# Patient Record
Sex: FEMALE | Race: WHITE | NOT HISPANIC OR LATINO | URBAN - METROPOLITAN AREA
[De-identification: names, ages, dates, MRNs, and addresses within clinical notes are randomized per-mention and may not be internally consistent; named-entity substitution may affect disease eponyms.]

---

## 2022-06-15 ENCOUNTER — OFFICE VISIT (OUTPATIENT)
Dept: NEUROLOGY | Facility: CLINIC | Age: 47
End: 2022-06-15
Payer: COMMERCIAL

## 2022-06-15 VITALS — OXYGEN SATURATION: 99 % | TEMPERATURE: 97.7 F | HEART RATE: 82 BPM | WEIGHT: 254.4 LBS | RESPIRATION RATE: 14 BRPM

## 2022-06-15 DIAGNOSIS — E55.9 VITAMIN D DEFICIENCY: ICD-10-CM

## 2022-06-15 DIAGNOSIS — G44.40 MEDICATION OVERUSE HEADACHE: ICD-10-CM

## 2022-06-15 DIAGNOSIS — E61.1 IRON DEFICIENCY: Primary | ICD-10-CM

## 2022-06-15 DIAGNOSIS — G43.711 INTRACTABLE CHRONIC MIGRAINE WITHOUT AURA AND WITH STATUS MIGRAINOSUS: ICD-10-CM

## 2022-06-15 DIAGNOSIS — M54.2 CERVICALGIA: ICD-10-CM

## 2022-06-15 DIAGNOSIS — E53.8 VITAMIN B12 DEFICIENCY: ICD-10-CM

## 2022-06-15 PROBLEM — E66.01 CLASS 3 SEVERE OBESITY DUE TO EXCESS CALORIES WITH BODY MASS INDEX (BMI) OF 40.0 TO 44.9 IN ADULT (CMS/HCC): Status: RESOLVED | Noted: 2019-07-17 | Resolved: 2022-06-15

## 2022-06-15 PROBLEM — N83.201 CYST OF RIGHT OVARY: Status: ACTIVE | Noted: 2018-02-19

## 2022-06-15 PROBLEM — E66.813 CLASS 3 SEVERE OBESITY DUE TO EXCESS CALORIES WITH BODY MASS INDEX (BMI) OF 40.0 TO 44.9 IN ADULT (CMS/HCC): Status: RESOLVED | Noted: 2019-07-17 | Resolved: 2022-06-15

## 2022-06-15 PROBLEM — L65.9 ALOPECIA: Status: ACTIVE | Noted: 2017-06-20

## 2022-06-15 PROBLEM — R93.89 THICKENED ENDOMETRIUM: Status: ACTIVE | Noted: 2019-01-04

## 2022-06-15 PROBLEM — E07.9 DISORDER OF THYROID GLAND: Status: ACTIVE | Noted: 2019-10-02

## 2022-06-15 PROBLEM — L90.0 LICHEN SCLEROSUS ET ATROPHICUS: Status: ACTIVE | Noted: 2017-12-06

## 2022-06-15 PROBLEM — R10.2 PAIN IN PELVIS: Status: ACTIVE | Noted: 2018-02-02

## 2022-06-15 PROBLEM — R10.2 PAIN IN PELVIS: Status: RESOLVED | Noted: 2018-02-02 | Resolved: 2022-06-15

## 2022-06-15 PROBLEM — E07.9 DISORDER OF THYROID GLAND: Status: RESOLVED | Noted: 2019-10-02 | Resolved: 2022-06-15

## 2022-06-15 PROBLEM — L65.9 ALOPECIA: Status: RESOLVED | Noted: 2017-06-20 | Resolved: 2022-06-15

## 2022-06-15 PROBLEM — E66.01 CLASS 3 SEVERE OBESITY DUE TO EXCESS CALORIES WITH BODY MASS INDEX (BMI) OF 40.0 TO 44.9 IN ADULT (CMS/HCC): Status: ACTIVE | Noted: 2019-07-17

## 2022-06-15 PROBLEM — E66.813 CLASS 3 SEVERE OBESITY DUE TO EXCESS CALORIES WITH BODY MASS INDEX (BMI) OF 40.0 TO 44.9 IN ADULT (CMS/HCC): Status: ACTIVE | Noted: 2019-07-17

## 2022-06-15 PROBLEM — M54.50 LOW BACK PAIN: Status: ACTIVE | Noted: 2019-02-13

## 2022-06-15 PROBLEM — R93.89 THICKENED ENDOMETRIUM: Status: RESOLVED | Noted: 2019-01-04 | Resolved: 2022-06-15

## 2022-06-15 PROBLEM — M54.50 LOW BACK PAIN: Status: RESOLVED | Noted: 2019-02-13 | Resolved: 2022-06-15

## 2022-06-15 PROBLEM — F41.1 GENERALIZED ANXIETY DISORDER: Status: ACTIVE | Noted: 2022-06-15

## 2022-06-15 PROCEDURE — 99205 OFFICE O/P NEW HI 60 MIN: CPT | Performed by: PSYCHIATRY & NEUROLOGY

## 2022-06-15 RX ORDER — VENLAFAXINE HYDROCHLORIDE 37.5 MG/1
CAPSULE, EXTENDED RELEASE ORAL
Qty: 60 CAPSULE | Refills: 1 | Status: SHIPPED | OUTPATIENT
Start: 2022-06-15 | End: 2022-07-21

## 2022-06-15 RX ORDER — IRBESARTAN 75 MG/1
75 TABLET ORAL NIGHTLY
COMMUNITY
End: 2022-08-26

## 2022-06-15 RX ORDER — AMITRIPTYLINE HYDROCHLORIDE 50 MG/1
TABLET, FILM COATED ORAL
COMMUNITY
End: 2022-07-21

## 2022-06-15 RX ORDER — SUMATRIPTAN SUCCINATE 50 MG/1
TABLET ORAL
COMMUNITY
End: 2022-08-26 | Stop reason: SDUPTHER

## 2022-06-15 RX ORDER — ALPRAZOLAM 0.25 MG/1
TABLET ORAL
COMMUNITY

## 2022-06-15 RX ORDER — RIMEGEPANT SULFATE 75 MG/75MG
75 TABLET, ORALLY DISINTEGRATING ORAL ONCE
Qty: 8 TABLET | Refills: 3 | Status: SHIPPED | OUTPATIENT
Start: 2022-06-15 | End: 2022-06-15

## 2022-06-15 NOTE — PATIENT INSTRUCTIONS
Please message us via My Chart with any questions or concerns    Of note: Patient lives 2 hours from .    Neck pain:  Basic neck exercises for daily use:    - Neck pathology and poor posture, with straightening of the normal cervical lordosis, can cause headaches.  Tightening of the neck muscles can irritate the nerves in the occipital region of the head and cause or worsen head pain. Thus neck strengthening and relaxation exercises, can help improve this particular pain. It is importance to have good posture for improving shoulder, neck, and head pain.    - Here are some exercises which should take 5 minutes:     1. Standing, drop your head to one side while continuing to look ahead. Hold for 10 seconds then swap sides. Repeat twice more each side. To increase the stretch, drop the opposite shoulder.    2. Standing again, lower your chin to your chest, hold for 10 and then look up to the ceiling and hold for 10. Repeat twice more.     3. Next, standing straight again, look over your right shoulder and hold firm for 10 seconds, then over your left shoulder for 10. Repeat this 3 times.     4. Finally, while sitting upright, bring your head forward and hold for 10, then all the way back and hold for 10.    If this simple exercise does not help improve the posture, we will consider formal physical therapy in the future.     Importance of Healthy Sleep:  Behavioral sleep changes can promote restful, regular sleep and reduce headache. Simple changes like establishing consistent sleep and wake-up times, as well as getting between 7 and 8 hours of sleep a day, can make a world of difference. Experts also recommend avoiding substances that impair sleep, like caffeine, nicotine and alcohol, and also suggest winding down before bed to prevent sleep problems. To read more go to [email or web address].org/resource-library/sleep/    Medication overuse headaches:   - Medication overuse headache  "(MOH) and analgesic overuse can negate the effectiveness of headache preventive measures.  Avoid medications with narcotics, barbiturates, or caffeine in them as these can cause rebound headaches after very few doses and can interfere with other headache medicine efficacy. Taking  any acute/abortive over the counter medication or prescription drugs for more than 2-3 days a week can cause medication overuse headache.   Chronic migraine headaches with and without aura  Preventive therapy for headaches:   - amitriptyline 50 mg one in pm daily and goal is to wean off of this slowly.   - will start her on venlafaxine 37.5 mg one in am daily for 6 weeks then call [facility] with update and if no side effects plan to go down on amitriptyline to 40 mg in pm. Then continue to increase venlafaxine and slowly wean off of this.   - Emgality 120 mg two injection as loading dose and then one injection once a month after that.   - consider infusion  - Nurtec 75 mg one in am every other day for 7 days  Abortive therapy for headaches:   - At the onset of headache: sumatriptan 50 mg or 100mg - goal to go down to 2-3 a week or less    Goal is to reduce Excedrin Migraine that she is taking twice a day along with Advil at bedtime.      Headache management instructions  - When patient has a moderate to severe headache, they should seek rest, initiate relaxation and apply cold compresses to the head.   - Maintain regular sleep schedule. Adults need at least 7-8 hours of uninterrupted sleep, per night.   - Limit over the counter medications such as Tylenol, Ibuprofen, Aleve, Excedrin. (No more than 2- 3 times a week or max 10 a month).  - Maintain headache diary.  Free ANTWON for a smart phone, which can be used is \"Migraine ammon\"  - Limit caffeine to 1-2 cups, 8 to 16 oz a day or less.  - Avoid dietary trigger. (aged cheese, peanuts, MSG, aspartame and nitrates).  - Patient is to have regular frequent meals to prevent headache onset.    - Please " drink at least 64 ounces of water a day, to help remain hydrated.              Headache Infusion Arrival Time/Info    ** Please be sure to bring lunch, snacks and drinks      Monday 8:00 am - 4:00 pm     Tuesday 8:00 am - 4:00 pm     Wednesday 8:00 am - 4:00 pm     Thursday 8:00 am - 4:00 pm     Friday  8:00 am - 3:30 pm     No 3- or 6-hour infusions after 1:00.  All infusions will be completed 30 minutes before closing.        Please bring Insurance Card and Photo ID  You will be rescheduled if you arrive after 11 am  To cancel appointment call center [phone]  Wear comfortable clothing and dress in layers with easy access to your arm  Must have a ride home.      INFUSION CENTER  What is the Infusion Center?  The infusion Center is where an individual may come to receive outpatient intravenous medication for the purpose of aborting his or her headache and//or migraine symptoms.  You will need to check with your physician or their nurse about what medicines to continue or stop during treatment.  The address of the infusion center is:  [address] [place] [address], [place] [address]    Who is eligible for treatment?  Individuals with frequent chronic daily headaches or migraine symptoms are eligible.  Patients - all of whom have been evaluated in the Headache Center - may have a pre-scheduled appointment.    How long will I be there?  Plan on staying about 2-3 hours, but the length of treatment may vary depending on the medications you will receive.  Your nurse will be able to give you an approximate time after she receives the orders from your physician.  Some individuals may be scheduled for 3 to 5 consecutive days, and some individuals will be receiving a one-day treatment only.      Does someone need to stay with me?  You will need to have transportation to and from the Infusion Center, as some medications we use may cause drowsiness.  You can have someone either drop you off in the morning, and then we  can call for your ride about 1 hour before you finish.  If your /family member will be staying, only one person is permitted to sit with you in the infusion area.  Because some of the medications can make you drowsy, you must have someone drive you, you can NOT use public transportation.    What will I be doing?  Most individuals prefer to sleep during the infusion.  There is a TV and DVD player in each infusion bay.  There are only limited DVD's so feel free to bring your own.  Also, if you wish, you may bring a CD or MP3 player as long as you have headphones.  All chairs in the treatment area recline for comfort.    What should I bring?  You may bring a light lunch, snack or beverage.  The infusion center does have a limited choice of some drinks and snacks.  The nurses will need access to your arm for placement of an IV so please dress appropriately.   Please refrain from wearing perfumes, colognes and heavy scented lotions.    What medications will I be receiving?  There are many medications that can be used for your infusion therapy, and your provider will decide which ones and how much you will receive.  The medicines are chosen based on your type of headache, other medical conditions, allergies, and previous response to certain medicines.  Below are the most common classifications of medications we use.  Within each classification, there are several different medicines.    ANTI-HISTAMINES:  Anti-Histamines are primarily used to treat allergies, and they are also helpful in promoting relaxation during your treatment and, therefore, may cause some drowsiness.  Some are known to abort headaches.    ANTI-EMETICS:  Anti-Emetics are used to prevent and/or treat nausea and vomiting, and some are known to abort headaches.  You may not have nausea upon arrival for your treatment, but some medicines have the potential to cause nausea.  A few of these medicines may cause drowsiness.    ANTI-SEIZURES:  Anti-seizure  medicines have shown to be effective in the prevention as well as the treatment of headaches.  It does not mean you have a seizure disorder.  These medicines help to desensitize painful, irritated nerve endings that can become inflamed during headache episodes.    SMOOTH MUSCLE RELAXER:  This medicine group helps to relax your muscles, especially in the neck and shoulder area.  Tightening of the muscles may contribute to your headache pain.  The most common one used for headache treatment is magnesium sulfate.  It is normal to feel flushing or warmth of your skin while infusing magnesium.    STEROIDS:  Steroids are used for their strong anti-inflammatory capability.  They help to decrease the swollen blood vessels, which will help to relieve headache pain.    You could experience some insomnia the night after receiving this medicine.  They are not the types of steroids abused by some athletes.    NSAIDS:  Non-Steroidal anti-inflammatory drugs also help to decrease swollen blood vessels that may cause increased headache pain.  This class of medicine is milder than steroids and has fewer side effects.    DHE-45 (DIHYDROERGOTAMINE):  DHE works as a vasoconstrictor, reducing the swollen vessels, thus relieving pain.  When receiving DHE for the first time, the dose may be divided into smaller amounts to increase the tolerance and decrease the chance of nausea.  After receiving DHE, your blood pressure will be monitored for signs of elevation.  As with any vasoconstricting medication, you may experience slight chest pressure.    What happens on discharge?  You will be given final instructions and review the medications ordered by your physician.         Cognitive behavioral therapy (CBT):  - Is a common type of talk therapy (psychotherapy) were you work with a psychotherapist or therapist . CBT helps you become aware of inaccurate or negative thinking so you can view challenging situations more clearly and respond to them  in a more effective way. CBT can be a very helpful tool ? either alone or in combination with other therapies ? in treating mental health disorders and chronic pain. CBT can be an effective tool to help anyone learn how to better manage stressful life situations and pain. In some cases, CBT is most effective when it's combined with other treatments, such as antidepressants or other medications.  You can start yourself by down loading the yamini: [employer]      Mindfulness/Meditation:  -Many people believe that stress is a major trigger for their pain. This is where mindfulness and meditation can come into play, as they have been known to help reduce migraine severity, duration and acute pain medication use. It may also help to relieve stress and anxiety while improving feelings of well being.    Biofeedback:   - Involves becoming more aware of the changes that occur in the body and learning how to exert control over generally involuntary functions. Biofeedback allows you to see your vitals in real-time and learn how to stabilize them on your own. There is great evidence that biofeedback can reduce the frequency, intensity, and duration of pain.   When you're stressed, you may notice elevated heart rates, tightened muscles, and sweating.  During biofeedback, you can see these changes on a monitor, then a therapist teaches you exercises to help manage these changes.    Yoga/Chris Chi:  - The kind of mind/body therapy that yoga can provide may help create relief from pain. Keeping up with yoga consistently can reduce headache frequency, intensity and duration, so it's important to practice regularly if you plan to use it as a complementary migraine treatment. However, certain types of yoga such as “ hot yoga”   may be uncomfortable for people with migraine. Others, such as “ restorative yoga,” may be tolerable even for a patient with chronic migraine.  Chris Chi can also have a similar benefit for patients with migraine.  Specifically, it can help improve balance, which can be very useful for those with vestibular symptoms or vestibular migraine.    Acupuncture:  - A traditional Chinese medicine, acupuncture is reported to increase the release of serotonin, dopamine and other chemicals that may help to treat chronic pain, and can be helpful in preventing episodic migraine. There are, however, conflicting results on studies in acupuncture as a treatment for migraine.    Exercising:    - Regular exercise can reduce the frequency and intensity pain. When one exercises, the body releases endorphins, which are the body's natural painkillers. Exercise reduces stress and helps individuals to sleep at night. Exercising at least 30 to 40 minutes 3 times a week is sufficient for most patients.   When exercising, follow this plan:  - First, stay hydrated before, during, and after exercise.    - Second part of the exercise plan is to eat sufficient food about an hour and a half before you exercise. Exercise causes one's blood sugar level to decrease, and it is important to have a source of energy.   - Final part of the exercise plan is to warm-up. Do not jump into sudden, vigorous exercise if that triggers a headache or migraine.       To read more go to [email or web address]-library/effects-of-exercise-on-headaches-and-migraines

## 2022-06-15 NOTE — PROGRESS NOTES
[facility]   Headache Center  Nupur Gandhi MD  [address] [facility] ([address])  [place] [address]       Patient ID: Smitha Cardozo    : 1975  MRN: [id number]                                            Visit Date: 6/15/2022  Encounter Provider: Nupur Gandhi  Referring Provider: No ref. provider found           Assessment/Plan   Problem List Items Addressed This Visit        Nervous    Cervicalgia    Intractable chronic migraine without aura and with status migrainosus    Relevant Medications    amitriptyline (ELAVIL) 50 mg tablet    SUMAtriptan (IMITREX) 50 mg tablet    ubrogepant (UBRELVY) 100 mg tablet tablet    venlafaxine XR (EFFEXOR XR) 37.5 mg 24 hr capsule    galcanezumab-gnlm (EMGALITY) 120 mg/mL pen injector subcutaneous pen    rimegepant (NURTEC ODT) 75 mg tablet,disintegrating    Medication overuse headache    Relevant Medications    venlafaxine XR (EFFEXOR XR) 37.5 mg 24 hr capsule    galcanezumab-gnlm (EMGALITY) 120 mg/mL pen injector subcutaneous pen    rimegepant (NURTEC ODT) 75 mg tablet,disintegrating       Digestive    Iron deficiency - Primary    Relevant Orders    Iron and TIBC    Ferritin    Vitamin D deficiency    Relevant Orders    Vitamin D 25 hydroxy      Other Visit Diagnoses     Vitamin B12 deficiency        Relevant Orders    Vitamin B12          Please message us via My Chart with any questions or concerns    Of note: Patient lives 2 hours from [place].    Neck pain:  Basic neck exercises for daily use:    - Neck pathology and poor posture, with straightening of the normal cervical lordosis, can cause headaches.  Tightening of the neck muscles can irritate the nerves in the occipital region of the head and cause or worsen head pain. Thus neck strengthening and relaxation exercises, can help improve this particular pain. It is importance to have good posture for improving shoulder, neck, and head pain.    - Here are some exercises which  should take 5 minutes:     1. Standing, drop your head to one side while continuing to look ahead. Hold for 10 seconds then swap sides. Repeat twice more each side. To increase the stretch, drop the opposite shoulder.    2. Standing again, lower your chin to your chest, hold for 10 and then look up to the ceiling and hold for 10. Repeat twice more.     3. Next, standing straight again, look over your right shoulder and hold firm for 10 seconds, then over your left shoulder for 10. Repeat this 3 times.     4. Finally, while sitting upright, bring your head forward and hold for 10, then all the way back and hold for 10.    If this simple exercise does not help improve the posture, we will consider formal physical therapy in the future.     Importance of Healthy Sleep:  Behavioral sleep changes can promote restful, regular sleep and reduce headache. Simple changes like establishing consistent sleep and wake-up times, as well as getting between 7 and 8 hours of sleep a day, can make a world of difference. Experts also recommend avoiding substances that impair sleep, like caffeine, nicotine and alcohol, and also suggest winding down before bed to prevent sleep problems. To read more go to https://americanmigrainefoundation.org/resource-library/sleep/    Medication overuse headaches:   - Medication overuse headache (MOH) and analgesic overuse can negate the effectiveness of headache preventive measures.  Avoid medications with narcotics, barbiturates, or caffeine in them as these can cause rebound headaches after very few doses and can interfere with other headache medicine efficacy. Taking  any acute/abortive over the counter medication or prescription drugs for more than 2-3 days a week can cause medication overuse headache.   Chronic migraine headaches with and without aura  Preventive therapy for headaches:   - amitriptyline 50 mg one in pm daily and goal is to wean off of this slowly.   - will start her on  "venlafaxine 37.5 mg one in am daily for 6 weeks then call Hiram with update and if no side effects plan to go down on amitriptyline to 40 mg in pm. Then continue to increase venlafaxine and slowly wean off of this.   - Emgality 120 mg two injection as loading dose and then one injection once a month after that.   - consider infusion  - Nurtec 75 mg one in am every other day for 7 days-Nurtec sample 2 boxes given to patient today  Abortive therapy for headaches:   - At the onset of headache: sumatriptan 50 mg or 100mg - goal to go down to 2-3 a week or less    Goal is to reduce Excedrin Migraine that she is taking twice a day along with Advil at bedtime.      Headache management instructions  - When patient has a moderate to severe headache, they should seek rest, initiate relaxation and apply cold compresses to the head.   - Maintain regular sleep schedule. Adults need at least 7-8 hours of uninterrupted sleep, per night.   - Limit over the counter medications such as Tylenol, Ibuprofen, Aleve, Excedrin. (No more than 2- 3 times a week or max 10 a month).  - Maintain headache diary.  Free ANTWON for a smart phone, which can be used is \"Migraine ammon\"  - Limit caffeine to 1-2 cups, 8 to 16 oz a day or less.  - Avoid dietary trigger. (aged cheese, peanuts, MSG, aspartame and nitrates).  - Patient is to have regular frequent meals to prevent headache onset.    - Please drink at least 64 ounces of water a day, to help remain hydrated.          No follow-ups on file.         _________________________________________________________________      Chief Complaint: No chief complaint on file.      Subjective     We had the pleasure of evaluating Smitha Cardozo in neurological consultation today. As you know she  is a 46 y.o.  years old  right handed female.  she is here today for evaluation of migraine headaches.  What kind of work do you do?    Personal history:  with 1 child    Medical history review: "   QTC: none in chart to review  Tobacco use: Never smoked  Vaping: Never used  Alcohol use: Socially  Illicit drug use: Never used  Daily Caffeine intake? Coffee - none , Tea - once in a while    , Soda - once in a while  Daily water intake?  64  oz  Hypertension  Nephrolithiasis 2022    Psychiatry history review:   Depression: no  Anxiety: yes, situational  Seeing a psychiatrist/ How often? no  Seeing at therapist/ how often? Yes in the past    Headache/pain history:  Any family history of migraines?  Yes, sister, mother, daughter with migraine headache   Any family history of aneurysms? none  Family history of any other neurological disease? none    Have you seen someone else for headaches or pain?  Yes at [facility] from [id number]    Headaches started at what age?  Started the age of 16 and started menstruation at age 12    What is your current pain level?   Headache: 4/10  Neck pain:5-6/10    How often do the headaches/pain occur? daily  Mild headaches: 5 days a week  Moderate to severe headaches: 2 times a week - 10/10 for times a day  Neck pain: daily    How often do you take abortive medication for headache in a week?  Over-the-counter medication: Daily over-the-counter medication for years - Excedrin migraines one in am and one in the afternoon. Then at night time she takes Advil  Migraine specific medication: 50 mg to 100mg once a day daily or at time twice a day. Paying out of pocket for this.     Are you ever headache free?     Aura/Warning and how long does it last?   Rare-scintillating Scotomas - blurred vision or sensation of a luminous appearance (zigzag, arc-shaped form, flickering, or shimmering) in front of  eyes with obstruction of vision.  45 minutes and occurs 4-6 times a year  Central Scotomas - blind spot that sits directly inline of sight x 1 and lasted for 30 minutes  Craving - sugar    What time of the day do headaches/pain start?  Mild headaches:  afternoon  Moderate to severe headaches: am  or anytime of the day, worse when she wakes up with it  Neck pain: there all the time but pain intensity fluctuates throughout the day    How long do the headaches/pain last?   Mild headaches: few hours with med's  Moderate to severe headaches: with an hour it goes down to a lower number  Neck pain: there all the time but goes to a lower number with med's    Describe your usual headache/pain?   Mild headaches: Aching, pressure  Moderate to severe headaches: Pounding, throbbing  Neck pain: tightness and aching    Where is your headache/pain located?   Mild headaches: frontal, right orbital  Moderate to severe headaches: frontal and orbital  Neck pain: base of her head and radiates down her shoulders - right more then left    What is the intensity of pain?   Mild headaches: 4/10  Moderate to severe headaches:5- 6 - 10/10  Neck pain: 4 to 6/10    Associated symptoms with headache or neck pain:   - Decrease appetite, Nausea,  - Photophobia, Phonophobia  - Insomnia   - Nasal congestion  - Stiff or sore neck  - Light headed - 1-2 times a year  - Off balance   - Problems with concentration   -  better with lying down   - Prefer to be in a cool, quiet, dark room     Number of days missed per month because of headaches/pain:   Work (or school) days:  Works through it  Social or Family activities: every other week    Pain is worse are worse if the patient: , bending over  Triggers: Stress, tension, missing meals, weather change, alcohol, oversleeping, dehydration  What time of the year do headaches occur more frequently? none    Sleep Habit:   How many hours do you actually sleep?  8 hours  Do you snore and or have been told that you stop breathing during sleeping?  Yes occasionally  Do you grind/clench your teeth at night?  Yes  Do you have jaw pain? yes    Are you currently pregnant or planning on getting pregnant?  No  Are you on a birth control pill? none    What treatment have you had in the past or currently using for  headaches/pain/mood?   Trigger point injection/Nerve block performed and how often? none  Epidural injections or trans foraminal injections performed?  yes for child birth only  Alternative therapies? massage, acupuncture and chiropractor  CBD or THC for your headaches and how often? NO   Other headache devices? NO   Botulinum toxin injection performed and how often? YES last Botox January 2019 x 2   Headache infusions:NO   Preventive medication therapy:   - Magnesium 400 mg, vitamin B2 400 mg,  - amitriptyline 50/100 mg (provided some help for headaches as she was on this for about 10 years or longer),   - Xanax 0.25,  - Metoprolol, verapamil (not sure why stopped use this long time ago), amlodipine 5 mg, propanolol (hair loss),  - Topamax (stopped due to paresthesias), Lyrica, Depakote,  - Flexeril (bad dreams)  Abortive medication Therapy:   -  - Sumatriptan 100/50 mg p.o., sumatriptan injectable, Treximet, Cafergot,  - ibuprofen,  - Excedrin,  - Prednisone (caused palpitation),  - Ubrelvy 100mg       Have you ever had any Brain imaging?  Yes  - I personally reviewed old notes over the last few months     8/31/2020-[facility]-8/31/2020 3:25 pm   HISTORY Headache, chronic, normal neuro exam   COMPARISON   CT HEAD WITHOUT CONTRAST dated 7/28/2017   TECHNIQUE   Multiplanar multi-sequence images of the brain were obtained without and with IV contrast   FINDINGS   There is no evidence of mass effect, midline shift, subacute intraparenchymal hemorrhage, or extra-axial fluid collections.   The ventricles are normal in size, shape, and configuration. Parenchymal volume is normal for age.   There is no evidence of abnormal restricted diffusion to suggest acute infarct.   Expected major vascular flow voids are seen.There is no evidence of abnormal enhancement.   No suprasellar masses are seen.   Cerebellar tonsils:  Normal. Clivus:  normal.    Visualized paranasal sinuses:  Mucosal thickening in the ethmoid and maxillary sinuses.   Visualized mastoid air cells:  Well aerated   10 x 7 mm T2 hyperintense lesion identified within the clivus, stable since the CT exam of 07/28/2000 17 May represent a cyst.  No pathological enhancement.   IMPRESSION   No intra-axial mass, hemorrhage, nor fluid collection.   10 mm likely cyst within the clivus.        Medications:   Current Outpatient Medications:   •  ALPRAZolam (XANAX) 0.25 mg tablet, alprazolam 0.25 mg tablet  TAKE 1 TABLET BY MOUTH DAILY AS NEEDED, Disp: , Rfl:   •  amitriptyline (ELAVIL) 50 mg tablet, , Disp: , Rfl:   •  galcanezumab-gnlm (EMGALITY) 120 mg/mL pen injector subcutaneous pen, Inject 2 mL (240 mg total) under the skin once for 1 dose. This is loading dose, then one injection monthly after that., Disp: 2 mL, Rfl: 0  •  irbesartan (AVAPRO) 75 mg tablet, Take 75 mg by mouth nightly., Disp: , Rfl:   •  rimegepant (NURTEC ODT) 75 mg tablet,disintegrating, Take 1 tablet (75 mg total) by mouth once for 1 dose. One at the onset of migraine, max one dose in 24 hours, Disp: 8 tablet, Rfl: 3  •  SUMAtriptan (IMITREX) 50 mg tablet, sumatriptan 50 mg tablet  take 2 tablets by mouth AT ONSET OF MIGRAINE. MAY REPEAT IN 2 HOURS, Disp: , Rfl:   •  ubrogepant (UBRELVY) 100 mg tablet tablet, Ubrelvy 100 mg tablet, Disp: , Rfl:   •  venlafaxine XR (EFFEXOR XR) 37.5 mg 24 hr capsule, One in am, Disp: 60 capsule, Rfl: 1    Past Medical History:  has no past medical history on file.    Past Surgical History:  has no past surgical history on file.    Social History:      Family History: family history is not on file.    Allergies: is allergic to levofloxacin.     Review of Systems  All other systems reviewed and negative except as noted in the HPI.      The following have been reviewed and updated as appropriate in this visit:            Objective   Physical Exam:    Visit Vitals  Pulse 82   Temp 36.5 °C (97.7 °F)    Resp 14   Wt 115 kg (254 lb 6.4 oz)   SpO2 99%         Musculoskeletal: - No injury or deformity  - Range of motion WNL     Behavior/Emotional: Appropriate, cooperative     Neurologic Exam:  Alert and oriented.       CN: intact    Motor: moving all extremities.    Sensory examination: was normal to  light touch    Coordination: No tremor noted    Reflexes: 2/4 throughout.       Gait: Was narrow based.             Nupur Gandhi MD    I spent 70 minutes on this date of service performing the following activities: obtaining history, performing examination, entering orders, documenting, preparing for visit, obtaining / reviewing records, providing counseling and education and independently reviewing study/studies.

## 2022-06-17 LAB
25(OH)D3+25(OH)D2 SERPL-MCNC: 32.8 NG/ML (ref 30–100)
FERRITIN SERPL-MCNC: 35 NG/ML (ref 15–150)
IRON SATN MFR SERPL: 24 % (ref 15–55)
IRON SERPL-MCNC: 76 UG/DL (ref 27–159)
TIBC SERPL-MCNC: 322 UG/DL (ref 250–450)
UIBC SERPL-MCNC: 246 UG/DL (ref 131–425)
VIT B12 SERPL-MCNC: 961 PG/ML (ref 232–1245)

## 2022-07-21 ENCOUNTER — TELEMEDICINE (OUTPATIENT)
Dept: NEUROLOGY | Facility: CLINIC | Age: 47
End: 2022-07-21
Payer: COMMERCIAL

## 2022-07-21 DIAGNOSIS — F41.1 GENERALIZED ANXIETY DISORDER: ICD-10-CM

## 2022-07-21 DIAGNOSIS — G44.40 MEDICATION OVERUSE HEADACHE: ICD-10-CM

## 2022-07-21 DIAGNOSIS — M54.2 CERVICALGIA: ICD-10-CM

## 2022-07-21 DIAGNOSIS — G24.3 ISOLATED CERVICAL DYSTONIA: ICD-10-CM

## 2022-07-21 DIAGNOSIS — G43.711 INTRACTABLE CHRONIC MIGRAINE WITHOUT AURA AND WITH STATUS MIGRAINOSUS: Primary | ICD-10-CM

## 2022-07-21 PROCEDURE — 99214 OFFICE O/P EST MOD 30 MIN: CPT | Mod: 95 | Performed by: PSYCHIATRY & NEUROLOGY

## 2022-07-21 RX ORDER — AMITRIPTYLINE HYDROCHLORIDE 10 MG/1
TABLET, FILM COATED ORAL
Qty: 30 TABLET | Refills: 0 | Status: SHIPPED | OUTPATIENT
Start: 2022-07-21 | End: 2022-08-26

## 2022-07-21 RX ORDER — PROTRIPTYLINE HYDROCHLORIDE 5 MG/1
TABLET, FILM COATED ORAL
Qty: 60 TABLET | Refills: 3 | Status: SHIPPED | OUTPATIENT
Start: 2022-07-21 | End: 2022-09-29

## 2022-07-21 NOTE — PROGRESS NOTES
Verification of Patient Location:  The patient affirms they are currently located in the following state: [place]    Request for Consent:    Audio and Video Encounter   Hello, my name is Nupur Gandhi MD.  Before we proceed, can you please verify your identification by telling me your full name and date of birth?  Can you tell me who is in the room with you?    You and I are about to have a telemedicine check-in or visit because you have requested it.  This is a live video-conference.  I am a real person, speaking to you in real time.  There is no one else with me on the video-conference.  However, when we use ([employer], [employer], etc) it is important for you to know that the video-conference may not be secure or private.  I am not recording this conversation and I am asking you not to record it.  This telemedicine visit will be billed to your health insurance or you, if you are self-insured.  You understand you will be responsible for any copayments or coinsurances that apply to your telemedicine visit.  Communication platform used for this encounter:  [employer] Video Visit (with Zoom integration)     Before starting our telemedicine visit, I am required to get your consent for this virtual check-in or visit by telemedicine. Do you consent?      Patient Response to Request for Consent:  Yes      [facility]   Headache Center  Nupur Gandhi MD  [address] [place] [address] ([address])  [place] [id number]       Patient ID: Smitha Cardozo    : 1975  MRN: [id number]                                            Visit Date: 2022  Encounter Provider: Nupur Gandhi  Referring Provider: No ref. provider found           Assessment/Plan   Problem List Items Addressed This Visit        Nervous    Cervicalgia    Intractable chronic migraine without aura and with status migrainosus - Primary    Relevant Medications    amitriptyline (ELAVIL) 10 mg tablet    protriptyline (VIVACTIL) 5 mg tablet     Medication overuse headache       Other    Generalized anxiety disorder    Relevant Medications    amitriptyline (ELAVIL) 10 mg tablet    protriptyline (VIVACTIL) 5 mg tablet          Please message us via My Chart with any questions or concerns    Of note: [facility]    Neck pain:  Isolated cervical dystonia  -We will submit for Dysport for her neck pain and spasticity.  Basic neck exercises for daily use:      1. Standing, drop your head to one side while continuing to look ahead. Hold for 10 seconds then swap sides. Repeat twice more each side. To increase the stretch, drop the opposite shoulder.    2. Standing again, lower your chin to your chest, hold for 10 and then look up to the ceiling and hold for 10. Repeat twice more.     3. Next, standing straight again, look over your right shoulder and hold firm for 10 seconds, then over your left shoulder for 10. Repeat this 3 times.     4. Finally, while sitting upright, bring your head forward and hold for 10, then all the way back and hold for 10.    If this simple exercise does not help improve the posture, we will consider formal physical therapy in the future.     Medication overuse headaches:  Chronic migraine headaches with and without aura  Preventive therapy for headaches:   -Patient is slowly weaning off of the amitriptyline.  Currently on 25 mg.  We will have her decrease this to 20 mg for 1 week then 10 mg for 1 week then stop.  - Start taking protriptyline 5 mg in a.m. starting now for 2 weeks then 10 mg in a.m. after that.  -Another option is Lamictal if patient fails protriptyline  -Continue Emgality 120 mg once a month.  -Bring patient in for injections and headache infusion for 2 to 3 days.  Abortive therapy for headaches:     - Using sumatriptan as an abortive and has used 25 tabs with then 6 weeks  - Using Ubrelvy as an abortive as well- has used 20 tabs within 6 weeks.    Over-the-counter medication:  Excedrin  "migraine about 15 a month  Advil at nighttime at least 4 times a week now.  - Still working on trying to slowly wean off of this.      Headache management instructions  - When patient has a moderate to severe headache, they should seek rest, initiate relaxation and apply cold compresses to the head.   - Maintain regular sleep schedule. Adults need at least 7-8 hours of uninterrupted sleep, per night.   - Limit over the counter medications such as Tylenol, Ibuprofen, Aleve, Excedrin. (No more than 2- 3 times a week or max 10 a month).  - Maintain headache diary.  Free ANTWON for a smart phone, which can be used is \"Migraine ammon\"  - Limit caffeine to 1-2 cups, 8 to 16 oz a day or less.  - Avoid dietary trigger. (aged cheese, peanuts, MSG, aspartame and nitrates).  - Patient is to have regular frequent meals to prevent headache onset.    - Please drink at least 64 ounces of water a day, to help remain hydrated.          No follow-ups on file.         _________________________________________________________________      Chief Complaint: No chief complaint on file.      Subjective     We had the pleasure of evaluating Smitha Cardozo in neurological follow up today. As you know she  is a 46 y.o.  years old  right handed female.  she is here today for evaluation of migraine headaches.  What kind of work do you do?    Personal history:  with 1 child    Medical history review:   QTC: none in chart to review  Hypertension  Nephrolithiasis 2022    Headache/pain history:  Headaches started at what age?  Started the age of 16 and started menstruation at age 12    What treatment have you had in the past or currently using for headaches/pain/mood?   Trigger point injection/Nerve block performed and how often? none  Epidural injections or trans foraminal injections performed?  yes for child birth only  Alternative therapies? massage, acupuncture and chiropractor  CBD or THC for your headaches and how often? NO "   Other headache devices? NO   Botulinum toxin injection performed and how often? YES last Botox January 2019 x 2   Headache infusions:NO   Preventive medication therapy:   - Magnesium 400 mg, vitamin B2 400 mg,  - amitriptyline 50/100 mg (provided some help for headaches as she was on this for about 10 years or longer),   - Xanax 0.25,  - Metoprolol, verapamil (not sure why stopped use this long time ago), amlodipine 5 mg, propanolol (hair loss),  - Topamax (stopped due to paresthesias/kidney stone), Lyrica, Depakote,  - Flexeril (bad dreams)  Abortive medication Therapy:   -  - Sumatriptan 100/50 mg p.o., sumatriptan injectable, Treximet, Cafergot,  - ibuprofen,  - Excedrin,  - Prednisone (caused palpitation),  - Ubrelvy 100mg     FOLLOW-UP CLINIC NOTE:  Last seen date: 6/15/2022  Last procedure date: None    Since last seen:  She did take Emgality and did have some side effects but nothing significant to report.  Overall states that she is tolerating Emgality and feels it does seem to be helping.  Has noted that a week before its due she seems to get more headaches and feels is wearing off.      What is your current pain level?   Headache: 1/10  Neck pain: 2/10    How often do the headaches/pain occur? daily  Mild headaches: 5 days a week a week before her second dose of Emgality she did have more mild headaches for 2 weeks.  Moderate to severe headaches:  -This last seen within the last 6 weeks she is only had 3 severe headaches.  -But still within the last 6 weeks she has used 25 sumatriptan hands, 20 Ubrelvy's.  - Did not think Nurtec was beneficial.  Neck pain: daily    How often do you take abortive medication for headache in a week?  Over-the-counter medication: Daily over-the-counter medication for years - Excedrin migraines one in am and one in the afternoon. Then at night time she takes Advil  -since last seen  - Used Advil at least 4 times a week.  - Use Excedrin at least 2-3 times a week.    Migraine  specific medication: 50 mg to 100mg once a day daily or at time twice a day. Paying out of pocket for this.   Since last seen:  Sumatriptan use within the last 6 weeks-25 tabs  Ubrelvy use within the last 6 weeks-20 tabs    Are you ever headache free?     Aura/Warning and how long does it last?   Rare-scintillating Scotomas - blurred vision or sensation of a luminous appearance (zigzag, arc-shaped form, flickering, or shimmering) in front of  eyes with obstruction of vision.  45 minutes and occurs 4-6 times a year  Central Scotomas - blind spot that sits directly inline of sight x 1 and lasted for 30 minutes  Craving - sugar    What time of the day do headaches/pain start?  Mild headaches:  afternoon  Moderate to severe headaches: am or anytime of the day, worse when she wakes up with it  Neck pain: there all the time but pain intensity fluctuates throughout the day    How long do the headaches/pain last?   Mild headaches: few hours with med's  Moderate to severe headaches: with an hour it goes down to a lower number  Neck pain: there all the time but goes to a lower number with med's    Describe your usual headache/pain?   Mild headaches: Aching, pressure  Moderate to severe headaches: Pounding, throbbing  Neck pain: tightness and aching    Where is your headache/pain located?   Mild headaches: frontal, right orbital  Moderate to severe headaches: frontal and orbital  Neck pain: base of her head and radiates down her shoulders - right more then left    What is the intensity of pain?   Mild headaches: 4/10  Moderate to severe headaches:5- 6 - 10/10  Neck pain: 4 to 6/10    Associated symptoms with headache or neck pain:   - Decrease appetite, Nausea,  - Photophobia, Phonophobia  - Insomnia   - Nasal congestion  - Stiff or sore neck  - Light headed - 1-2 times a year  - Off balance   - Problems with concentration   -  better with lying down   - Prefer to be in a cool, quiet, dark room     Pain is worse are worse if  the patient: , bending over  Triggers: Stress, tension, missing meals, weather change, alcohol, oversleeping, dehydration    Have you ever had any Brain imaging?  Yes  - I personally reviewed old notes over the last few months     [facility]-8/31/2020 3:25 pm   HISTORY Headache, chronic, normal neuro exam   COMPARISON   CT HEAD WITHOUT CONTRAST dated 7/28/2017   TECHNIQUE   Multiplanar multi-sequence images of the brain were obtained without and with IV contrast   FINDINGS   There is no evidence of mass effect, midline shift, subacute intraparenchymal hemorrhage, or extra-axial fluid collections.   The ventricles are normal in size, shape, and configuration. Parenchymal volume is normal for age.   There is no evidence of abnormal restricted diffusion to suggest acute infarct.   Expected major vascular flow voids are seen.There is no evidence of abnormal enhancement.   No suprasellar masses are seen.   Cerebellar tonsils:  Normal. Clivus:  normal.   Visualized paranasal sinuses:  Mucosal thickening in the ethmoid and maxillary sinuses.   Visualized mastoid air cells:  Well aerated   10 x 7 mm T2 hyperintense lesion identified within the clivus, stable since the CT exam of 07/28/2000 17 May represent a cyst.  No pathological enhancement.   IMPRESSION   No intra-axial mass, hemorrhage, nor fluid collection.   10 mm likely cyst within the clivus.        Medications:   Current Outpatient Medications:   •  amitriptyline (ELAVIL) 10 mg tablet, 2 tabs at bedtime for 1 week then 1 tab at bedtime for 1 week then stop, Disp: 30 tablet, Rfl: 0  •  protriptyline (VIVACTIL) 5 mg tablet, 1 in a.m. for 2 weeks then 2 in a.m. after that, Disp: 60 tablet, Rfl: 3  •  ALPRAZolam (XANAX) 0.25 mg tablet, alprazolam 0.25 mg tablet  TAKE 1 TABLET BY MOUTH DAILY AS NEEDED, Disp: , Rfl:   •  galcanezumab-gnlm (EMGALITY) 120 mg/mL pen injector subcutaneous  pen, Inject 1 mL (120 mg total) under the skin every 28 (twentyeight) days., Disp: 1 mL, Rfl: 3  •  irbesartan (AVAPRO) 75 mg tablet, Take 75 mg by mouth nightly., Disp: , Rfl:   •  SUMAtriptan (IMITREX) 50 mg tablet, sumatriptan 50 mg tablet  take 2 tablets by mouth AT ONSET OF MIGRAINE. MAY REPEAT IN 2 HOURS, Disp: , Rfl:   •  ubrogepant (UBRELVY) 100 mg tablet tablet, Ubrelvy 100 mg tablet, Disp: , Rfl:     Past Medical History:  has no past medical history on file.    Past Surgical History:  has no past surgical history on file.    Social History:      Family History: family history is not on file.    Allergies: is allergic to levofloxacin.     Review of Systems  All other systems reviewed and negative except as noted in the HPI.      Nupur Gandhi MD    Time Spent:  I spent 30 minutes on this date of service performing the following activities: obtaining history, entering orders, documenting, preparing for visit, obtaining / reviewing records, providing counseling and education, independently reviewing study/studies, communicating results and coordinating care.

## 2022-07-21 NOTE — PATIENT INSTRUCTIONS
Please message us via My Chart with any questions or concerns    Of note: Patient lives 2 hours from [facility].    Neck pain:  Isolated cervical dystonia  -We will submit for Dysport for her neck pain and spasticity.  Basic neck exercises for daily use:      1. Standing, drop your head to one side while continuing to look ahead. Hold for 10 seconds then swap sides. Repeat twice more each side. To increase the stretch, drop the opposite shoulder.    2. Standing again, lower your chin to your chest, hold for 10 and then look up to the ceiling and hold for 10. Repeat twice more.     3. Next, standing straight again, look over your right shoulder and hold firm for 10 seconds, then over your left shoulder for 10. Repeat this 3 times.     4. Finally, while sitting upright, bring your head forward and hold for 10, then all the way back and hold for 10.    If this simple exercise does not help improve the posture, we will consider formal physical therapy in the future.     Medication overuse headaches:  Chronic migraine headaches with and without aura  Preventive therapy for headaches:   -Patient is slowly weaning off of the amitriptyline.  Currently on 25 mg.  We will have her decrease this to 20 mg for 1 week then 10 mg for 1 week then stop.  - Start taking protriptyline 5 mg in a.m. starting now for 2 weeks then 10 mg in a.m. after that.  -Another option is Lamictal if patient fails protriptyline  -Continue Emgality 120 mg once a month.  -Bring patient in for injections and headache infusion for 2 to 3 days.  Abortive therapy for headaches:     - Using sumatriptan as an abortive and has used 25 tabs with then 6 weeks  - Using Ubrelvy as an abortive as well- has used 20 tabs within 6 weeks.    Over-the-counter medication:  Excedrin migraine about 15 a month  Advil at nighttime at least 4 times a week now.  - Still working on trying to slowly wean off of this.      Headache management instructions  - When patient  "has a moderate to severe headache, they should seek rest, initiate relaxation and apply cold compresses to the head.   - Maintain regular sleep schedule. Adults need at least 7-8 hours of uninterrupted sleep, per night.   - Limit over the counter medications such as Tylenol, Ibuprofen, Aleve, Excedrin. (No more than 2- 3 times a week or max 10 a month).  - Maintain headache diary.  Free ANTWON for a smart phone, which can be used is \"Migraine ammon\"  - Limit caffeine to 1-2 cups, 8 to 16 oz a day or less.  - Avoid dietary trigger. (aged cheese, peanuts, MSG, aspartame and nitrates).  - Patient is to have regular frequent meals to prevent headache onset.    - Please drink at least 64 ounces of water a day, to help remain hydrated.  "

## 2022-07-25 ENCOUNTER — TELEPHONE (OUTPATIENT)
Dept: NEUROLOGY | Facility: CLINIC | Age: 47
End: 2022-07-25
Payer: COMMERCIAL

## 2022-07-25 NOTE — TELEPHONE ENCOUNTER
----- Message from Karol Corrales sent at 7/25/2022 12:26 PM EDT -----  Dysport 500 units approved 7/21/2022-7/21/2023  Auth # [id number]  B[employer]B OK  Once we have Dysport in stock, patient can be scheduled. Thank you!  ----- Message -----  From: Karol Corrales  Sent: 7/21/2022  12:00 PM EDT  To: Suzy Alexandra MA, Nupur Gandhi MD, #    Submitted to [employer]- Pending Authorization Number : [id number]  B&B once approved.  Thank you!  ----- Message -----  From: Nupur Gandhi MD  Sent: 7/21/2022  11:42 AM EDT  To: Suzy Alexandra MA, Jerri Ambrose, #    Need Dysport 500 units for patient's isolated cervical dystonia.  Thank you

## 2022-07-27 ENCOUNTER — TELEPHONE (OUTPATIENT)
Dept: NEUROLOGY | Facility: CLINIC | Age: 47
End: 2022-07-27
Payer: COMMERCIAL

## 2022-07-27 NOTE — TELEPHONE ENCOUNTER
----- Message from Karol Corrales sent at 7/25/2022 12:26 PM EDT -----  Dysport approved. Once we have it in stock, we can coordinate with her infusion. No auth req for infusion meds.  Thank you!  ----- Message -----  From: Nupur Gandhi MD  Sent: 7/21/2022  11:43 AM EDT  To: Nupur Gandhi MD, [facility], #    Need to bring patient in for headache infusion for 2 to 3 days.  Plan for this is to bring her in when she comes in for Dysport injections as patient lives 2 hours from here.

## 2022-08-01 ENCOUNTER — TELEPHONE (OUTPATIENT)
Dept: NEUROLOGY | Facility: CLINIC | Age: 47
End: 2022-08-01

## 2022-08-04 ENCOUNTER — INFUSION (OUTPATIENT)
Dept: INFUSION THERAPY | Facility: CLINIC | Age: 47
End: 2022-08-04
Payer: COMMERCIAL

## 2022-08-04 ENCOUNTER — OFFICE VISIT (OUTPATIENT)
Dept: NEUROLOGY | Facility: CLINIC | Age: 47
End: 2022-08-04
Payer: COMMERCIAL

## 2022-08-04 VITALS
RESPIRATION RATE: 16 BRPM | SYSTOLIC BLOOD PRESSURE: 147 MMHG | HEART RATE: 96 BPM | TEMPERATURE: 97.6 F | OXYGEN SATURATION: 99 % | DIASTOLIC BLOOD PRESSURE: 88 MMHG

## 2022-08-04 VITALS — SYSTOLIC BLOOD PRESSURE: 142 MMHG | TEMPERATURE: 97.3 F | DIASTOLIC BLOOD PRESSURE: 80 MMHG

## 2022-08-04 DIAGNOSIS — G43.711 INTRACTABLE CHRONIC MIGRAINE WITHOUT AURA AND WITH STATUS MIGRAINOSUS: Primary | ICD-10-CM

## 2022-08-04 DIAGNOSIS — G24.3 ISOLATED CERVICAL DYSTONIA: ICD-10-CM

## 2022-08-04 PROCEDURE — 64615 CHEMODENERV MUSC MIGRAINE: CPT | Performed by: PSYCHIATRY & NEUROLOGY

## 2022-08-04 PROCEDURE — 96360 HYDRATION IV INFUSION INIT: CPT | Mod: XU | Performed by: PSYCHIATRY & NEUROLOGY

## 2022-08-04 PROCEDURE — 99999 PR OFFICE/OUTPT VISIT,PROCEDURE ONLY: CPT | Performed by: PSYCHIATRY & NEUROLOGY

## 2022-08-04 PROCEDURE — 96365 THER/PROPH/DIAG IV INF INIT: CPT | Performed by: PSYCHIATRY & NEUROLOGY

## 2022-08-04 PROCEDURE — 96375 TX/PRO/DX INJ NEW DRUG ADDON: CPT | Performed by: PSYCHIATRY & NEUROLOGY

## 2022-08-04 PROCEDURE — 96367 TX/PROPH/DG ADDL SEQ IV INF: CPT | Performed by: PSYCHIATRY & NEUROLOGY

## 2022-08-04 RX ORDER — EPINEPHRINE 1 MG/ML
0.5 INJECTION, SOLUTION INTRAMUSCULAR; SUBCUTANEOUS ONCE AS NEEDED
Status: DISCONTINUED | OUTPATIENT
Start: 2022-08-04 | End: 2022-08-04 | Stop reason: HOSPADM

## 2022-08-04 RX ORDER — SODIUM CHLORIDE 9 MG/ML
1000 INJECTION, SOLUTION INTRAVENOUS ONCE
Status: COMPLETED | OUTPATIENT
Start: 2022-08-04 | End: 2022-08-04

## 2022-08-04 RX ORDER — EPINEPHRINE 1 MG/ML
0.5 INJECTION, SOLUTION INTRAMUSCULAR; SUBCUTANEOUS ONCE AS NEEDED
Status: CANCELLED | OUTPATIENT
Start: 2022-10-27

## 2022-08-04 RX ORDER — SODIUM CHLORIDE 0.9 % (FLUSH) 0.9 %
3 SYRINGE (ML) INJECTION AS NEEDED
Status: DISCONTINUED | OUTPATIENT
Start: 2022-08-04 | End: 2022-08-04 | Stop reason: HOSPADM

## 2022-08-04 RX ORDER — FAMOTIDINE 10 MG/ML
20 INJECTION INTRAVENOUS ONCE
Status: DISCONTINUED | OUTPATIENT
Start: 2022-08-04 | End: 2022-08-04 | Stop reason: HOSPADM

## 2022-08-04 RX ORDER — FAMOTIDINE 10 MG/ML
20 INJECTION INTRAVENOUS 2 TIMES DAILY
Status: CANCELLED | OUTPATIENT
Start: 2022-10-27

## 2022-08-04 RX ORDER — SODIUM CHLORIDE 0.9 % (FLUSH) 0.9 %
3 SYRINGE (ML) INJECTION AS NEEDED
Status: CANCELLED | OUTPATIENT
Start: 2022-10-27

## 2022-08-04 RX ORDER — ACETAMINOPHEN 325 MG/1
650 TABLET ORAL EVERY 6 HOURS PRN
Status: CANCELLED | OUTPATIENT
Start: 2022-10-27

## 2022-08-04 RX ORDER — DIPHENHYDRAMINE HYDROCHLORIDE 50 MG/ML
25 INJECTION INTRAMUSCULAR; INTRAVENOUS EVERY 6 HOURS PRN
Status: CANCELLED | OUTPATIENT
Start: 2022-10-27

## 2022-08-04 RX ORDER — FAMOTIDINE 10 MG/ML
20 INJECTION INTRAVENOUS ONCE AS NEEDED
Status: CANCELLED | OUTPATIENT
Start: 2022-10-27

## 2022-08-04 RX ORDER — LORAZEPAM 2 MG/ML
1 INJECTION INTRAMUSCULAR EVERY 6 HOURS PRN
Status: CANCELLED | OUTPATIENT
Start: 2022-10-27

## 2022-08-04 RX ORDER — METHOCARBAMOL 500 MG/1
1000 TABLET, FILM COATED ORAL ONCE
Status: CANCELLED | OUTPATIENT
Start: 2022-10-27

## 2022-08-04 RX ORDER — METHOCARBAMOL 500 MG/1
1000 TABLET, FILM COATED ORAL ONCE
Status: COMPLETED | OUTPATIENT
Start: 2022-08-04 | End: 2022-08-04

## 2022-08-04 RX ORDER — FAMOTIDINE 10 MG/ML
20 INJECTION INTRAVENOUS ONCE
Status: CANCELLED | OUTPATIENT
Start: 2022-10-27

## 2022-08-04 RX ORDER — KETOROLAC TROMETHAMINE 30 MG/ML
30 INJECTION, SOLUTION INTRAMUSCULAR; INTRAVENOUS ONCE AS NEEDED
Status: CANCELLED | OUTPATIENT
Start: 2022-10-27

## 2022-08-04 RX ORDER — KETOROLAC TROMETHAMINE 30 MG/ML
30 INJECTION, SOLUTION INTRAMUSCULAR; INTRAVENOUS ONCE AS NEEDED
Status: DISCONTINUED | OUTPATIENT
Start: 2022-08-04 | End: 2022-08-04 | Stop reason: HOSPADM

## 2022-08-04 RX ORDER — SODIUM CHLORIDE 9 MG/ML
1000 INJECTION, SOLUTION INTRAVENOUS ONCE
Status: CANCELLED | OUTPATIENT
Start: 2022-10-27

## 2022-08-04 RX ORDER — FAMOTIDINE 10 MG/ML
20 INJECTION INTRAVENOUS ONCE AS NEEDED
Status: DISCONTINUED | OUTPATIENT
Start: 2022-08-04 | End: 2022-08-04 | Stop reason: HOSPADM

## 2022-08-04 RX ORDER — PROCHLORPERAZINE EDISYLATE 5 MG/ML
10 INJECTION INTRAMUSCULAR; INTRAVENOUS ONCE
Status: COMPLETED | OUTPATIENT
Start: 2022-08-04 | End: 2022-08-04

## 2022-08-04 RX ORDER — ONDANSETRON HYDROCHLORIDE 2 MG/ML
4 INJECTION, SOLUTION INTRAVENOUS ONCE AS NEEDED
Status: CANCELLED | OUTPATIENT
Start: 2022-10-27

## 2022-08-04 RX ORDER — PROCHLORPERAZINE EDISYLATE 5 MG/ML
10 INJECTION INTRAMUSCULAR; INTRAVENOUS ONCE
Status: CANCELLED | OUTPATIENT
Start: 2022-10-27

## 2022-08-04 RX ADMIN — METHOCARBAMOL 1000 MG: 500 TABLET, FILM COATED ORAL at 11:57

## 2022-08-04 RX ADMIN — SODIUM CHLORIDE 1000 ML: 9 INJECTION, SOLUTION INTRAVENOUS at 12:12

## 2022-08-04 RX ADMIN — PROCHLORPERAZINE EDISYLATE 10 MG: 5 INJECTION INTRAMUSCULAR; INTRAVENOUS at 13:04

## 2022-08-04 NOTE — PROGRESS NOTES
Pt escorted by RN back to infusion suite, pt verified by name and , ID band placed.   [facility] Checklist filled out by pt and reviewed by RN,  pt denies pregnancy or breastfeeding.     Pt's pain level upon arrival to infusion suite 2/10. Pt gave verbal consent to obtain IV access and begin infusion treatment plan. Medication education given and all pt questions answered.     Infusion therapy started, see MAR and flowsheets for medication administration and VS.     Pt tolerated infusion with no complaints after treatment. Pain 0/10 at completion of treatment. IV removed. AVS and infusion center info packet given to patient. Instructed pt to call office if they have any questions or concerns. Pt stable upon leaving infusion center, has ride home with .

## 2022-08-04 NOTE — PROGRESS NOTES
Injection Procedures    Date/Time: 8/4/2022 2:22 PM  Performed by: Nupur Gandhi MD  Authorized by: Nupur Gandhi MD     Consent:     Consent obtained:  Written    Consent given by:  Patient       [facility]  Nupur Gandhi MD  [address] [place] [address] ([address])  [place] [address]         Indication: Isolated cervical dystonia/chronic migraine headache    Procedure details:     Position:  Upright    Botulinum toxin:     Type:  Type A    Brand: Dysport     Procedures:                 Right  injection amount:  12.5 units      Left  injection amount::  12.5 units      Procerus (midline) injection amount: 12.5 units        Right orbicularis oculi (lateral) injection amount:  12.5 units      Left orbicularis oculi (lateral) injection amount: 12.5 units        Right lateral frontalis injection amount:: 12.5 units      Right medial frontalis injection amount: 12.5 units     Left lateral frontalis injection amount: 12.5 units      Left medial frontalis injection amount:12.5 units        Right temporalis injection amount:  50 units      Left temporalis injection amount: 50 units        Right occipitalis injection amount:  37.5 units      Left occipitalis injection amount:  37.5 units        Right cervical paraspinal injection amount:  25 units      Left cervical paraspinal injection amount:  25 units        Right trapezius injection amount:  37.5 units      Left trapezius injection amount:  37.5 units       Right sternocleidomastoid injection amount: 12.5 units    Left sternocleidomastoid injection amount: 12.5 units      Right lower trapezius injection amount: 12,5  units     Left lower trapezius injection amount: 12.5 units       Total Units:     Total units used: 462.5 units    Total units discarded: 37.5 units       Post-procedure details:     Chemodenervation: Isolated cervical dystonia/chronic migraine headache    Patient tolerance of procedure:  Tolerated well,  no immediate complications

## 2022-08-05 ENCOUNTER — INFUSION (OUTPATIENT)
Dept: INFUSION THERAPY | Facility: CLINIC | Age: 47
End: 2022-08-05
Payer: COMMERCIAL

## 2022-08-05 VITALS
SYSTOLIC BLOOD PRESSURE: 150 MMHG | HEART RATE: 105 BPM | OXYGEN SATURATION: 98 % | DIASTOLIC BLOOD PRESSURE: 86 MMHG | TEMPERATURE: 98.4 F | RESPIRATION RATE: 18 BRPM

## 2022-08-05 DIAGNOSIS — G43.711 INTRACTABLE CHRONIC MIGRAINE WITHOUT AURA AND WITH STATUS MIGRAINOSUS: Primary | ICD-10-CM

## 2022-08-05 PROCEDURE — 96360 HYDRATION IV INFUSION INIT: CPT | Mod: XU | Performed by: PSYCHIATRY & NEUROLOGY

## 2022-08-05 PROCEDURE — 96367 TX/PROPH/DG ADDL SEQ IV INF: CPT | Performed by: PSYCHIATRY & NEUROLOGY

## 2022-08-05 PROCEDURE — 96365 THER/PROPH/DIAG IV INF INIT: CPT | Performed by: PSYCHIATRY & NEUROLOGY

## 2022-08-05 PROCEDURE — 96375 TX/PRO/DX INJ NEW DRUG ADDON: CPT | Performed by: PSYCHIATRY & NEUROLOGY

## 2022-08-05 RX ORDER — FAMOTIDINE 10 MG/ML
20 INJECTION INTRAVENOUS ONCE
Status: CANCELLED | OUTPATIENT
Start: 2022-10-28

## 2022-08-05 RX ORDER — FAMOTIDINE 10 MG/ML
20 INJECTION INTRAVENOUS 2 TIMES DAILY
Status: CANCELLED | OUTPATIENT
Start: 2022-10-27

## 2022-08-05 RX ORDER — FAMOTIDINE 10 MG/ML
20 INJECTION INTRAVENOUS ONCE AS NEEDED
Status: DISCONTINUED | OUTPATIENT
Start: 2022-08-05 | End: 2022-08-05 | Stop reason: HOSPADM

## 2022-08-05 RX ORDER — ACETAMINOPHEN 325 MG/1
650 TABLET ORAL EVERY 6 HOURS PRN
Status: CANCELLED | OUTPATIENT
Start: 2022-10-27

## 2022-08-05 RX ORDER — KETOROLAC TROMETHAMINE 30 MG/ML
30 INJECTION, SOLUTION INTRAMUSCULAR; INTRAVENOUS ONCE AS NEEDED
Status: CANCELLED | OUTPATIENT
Start: 2022-10-27

## 2022-08-05 RX ORDER — DIVALPROEX SODIUM 500 MG/1
TABLET, DELAYED RELEASE ORAL
Qty: 5 TABLET | Refills: 0 | Status: SHIPPED | OUTPATIENT
Start: 2022-08-05 | End: 2022-10-28 | Stop reason: SDUPTHER

## 2022-08-05 RX ORDER — FAMOTIDINE 10 MG/ML
20 INJECTION INTRAVENOUS ONCE
Status: CANCELLED | OUTPATIENT
Start: 2022-10-27

## 2022-08-05 RX ORDER — DEXAMETHASONE 2 MG/1
TABLET ORAL
Qty: 5 TABLET | Refills: 0 | Status: SHIPPED | OUTPATIENT
Start: 2022-08-05 | End: 2023-07-14 | Stop reason: SDUPTHER

## 2022-08-05 RX ORDER — SODIUM CHLORIDE 9 MG/ML
1000 INJECTION, SOLUTION INTRAVENOUS ONCE
Status: CANCELLED | OUTPATIENT
Start: 2022-10-27

## 2022-08-05 RX ORDER — SODIUM CHLORIDE 9 MG/ML
1000 INJECTION, SOLUTION INTRAVENOUS ONCE
Status: CANCELLED | OUTPATIENT
Start: 2022-10-28

## 2022-08-05 RX ORDER — EPINEPHRINE 1 MG/ML
0.5 INJECTION, SOLUTION INTRAMUSCULAR; SUBCUTANEOUS ONCE AS NEEDED
Status: CANCELLED | OUTPATIENT
Start: 2022-10-27

## 2022-08-05 RX ORDER — EPINEPHRINE 1 MG/ML
0.5 INJECTION, SOLUTION INTRAMUSCULAR; SUBCUTANEOUS ONCE AS NEEDED
Status: DISCONTINUED | OUTPATIENT
Start: 2022-08-05 | End: 2022-08-05 | Stop reason: HOSPADM

## 2022-08-05 RX ORDER — SODIUM CHLORIDE 0.9 % (FLUSH) 0.9 %
3 SYRINGE (ML) INJECTION AS NEEDED
Status: CANCELLED | OUTPATIENT
Start: 2022-10-28

## 2022-08-05 RX ORDER — PROCHLORPERAZINE EDISYLATE 5 MG/ML
10 INJECTION INTRAMUSCULAR; INTRAVENOUS ONCE
Status: CANCELLED | OUTPATIENT
Start: 2022-10-27

## 2022-08-05 RX ORDER — PROCHLORPERAZINE EDISYLATE 5 MG/ML
10 INJECTION INTRAMUSCULAR; INTRAVENOUS ONCE
Status: COMPLETED | OUTPATIENT
Start: 2022-08-05 | End: 2022-08-05

## 2022-08-05 RX ORDER — FAMOTIDINE 10 MG/ML
20 INJECTION INTRAVENOUS ONCE AS NEEDED
Status: CANCELLED | OUTPATIENT
Start: 2022-10-27

## 2022-08-05 RX ORDER — FAMOTIDINE 10 MG/ML
20 INJECTION INTRAVENOUS ONCE AS NEEDED
Status: CANCELLED | OUTPATIENT
Start: 2022-10-28

## 2022-08-05 RX ORDER — METHOCARBAMOL 500 MG/1
1000 TABLET, FILM COATED ORAL ONCE
Status: CANCELLED | OUTPATIENT
Start: 2022-10-27

## 2022-08-05 RX ORDER — FAMOTIDINE 10 MG/ML
20 INJECTION INTRAVENOUS 2 TIMES DAILY
Status: CANCELLED | OUTPATIENT
Start: 2022-10-28

## 2022-08-05 RX ORDER — EPINEPHRINE 1 MG/ML
0.5 INJECTION, SOLUTION INTRAMUSCULAR; SUBCUTANEOUS ONCE AS NEEDED
Status: CANCELLED | OUTPATIENT
Start: 2022-10-28

## 2022-08-05 RX ORDER — ONDANSETRON HYDROCHLORIDE 2 MG/ML
4 INJECTION, SOLUTION INTRAVENOUS ONCE AS NEEDED
Status: CANCELLED | OUTPATIENT
Start: 2022-10-28

## 2022-08-05 RX ORDER — SODIUM CHLORIDE 9 MG/ML
1000 INJECTION, SOLUTION INTRAVENOUS ONCE
Status: COMPLETED | OUTPATIENT
Start: 2022-08-05 | End: 2022-08-05

## 2022-08-05 RX ORDER — SODIUM CHLORIDE 0.9 % (FLUSH) 0.9 %
3 SYRINGE (ML) INJECTION AS NEEDED
Status: DISCONTINUED | OUTPATIENT
Start: 2022-08-05 | End: 2022-08-05 | Stop reason: HOSPADM

## 2022-08-05 RX ORDER — DIPHENHYDRAMINE HYDROCHLORIDE 50 MG/ML
25 INJECTION INTRAMUSCULAR; INTRAVENOUS EVERY 6 HOURS PRN
Status: CANCELLED | OUTPATIENT
Start: 2022-10-28

## 2022-08-05 RX ORDER — METHOCARBAMOL 500 MG/1
1000 TABLET, FILM COATED ORAL ONCE
Status: CANCELLED | OUTPATIENT
Start: 2022-10-28

## 2022-08-05 RX ORDER — KETOROLAC TROMETHAMINE 30 MG/ML
30 INJECTION, SOLUTION INTRAMUSCULAR; INTRAVENOUS ONCE AS NEEDED
Status: CANCELLED | OUTPATIENT
Start: 2022-10-28

## 2022-08-05 RX ORDER — LORAZEPAM 2 MG/ML
1 INJECTION INTRAMUSCULAR EVERY 6 HOURS PRN
Status: CANCELLED | OUTPATIENT
Start: 2022-10-27

## 2022-08-05 RX ORDER — LORAZEPAM 2 MG/ML
1 INJECTION INTRAMUSCULAR EVERY 6 HOURS PRN
Status: CANCELLED | OUTPATIENT
Start: 2022-10-28

## 2022-08-05 RX ORDER — SODIUM CHLORIDE 0.9 % (FLUSH) 0.9 %
3 SYRINGE (ML) INJECTION AS NEEDED
Status: CANCELLED | OUTPATIENT
Start: 2022-10-27

## 2022-08-05 RX ORDER — KETOROLAC TROMETHAMINE 30 MG/ML
30 INJECTION, SOLUTION INTRAMUSCULAR; INTRAVENOUS ONCE AS NEEDED
Status: DISCONTINUED | OUTPATIENT
Start: 2022-08-05 | End: 2022-08-05 | Stop reason: HOSPADM

## 2022-08-05 RX ORDER — ONDANSETRON HYDROCHLORIDE 2 MG/ML
4 INJECTION, SOLUTION INTRAVENOUS ONCE AS NEEDED
Status: CANCELLED | OUTPATIENT
Start: 2022-10-27

## 2022-08-05 RX ORDER — ACETAMINOPHEN 325 MG/1
650 TABLET ORAL EVERY 6 HOURS PRN
Status: CANCELLED | OUTPATIENT
Start: 2022-10-28

## 2022-08-05 RX ORDER — DIPHENHYDRAMINE HYDROCHLORIDE 50 MG/ML
25 INJECTION INTRAMUSCULAR; INTRAVENOUS EVERY 6 HOURS PRN
Status: CANCELLED | OUTPATIENT
Start: 2022-10-27

## 2022-08-05 RX ORDER — METHOCARBAMOL 500 MG/1
1000 TABLET, FILM COATED ORAL ONCE
Status: COMPLETED | OUTPATIENT
Start: 2022-08-05 | End: 2022-08-05

## 2022-08-05 RX ADMIN — PROCHLORPERAZINE EDISYLATE 10 MG: 5 INJECTION INTRAMUSCULAR; INTRAVENOUS at 10:03

## 2022-08-05 RX ADMIN — KETOROLAC TROMETHAMINE 30 MG: 30 INJECTION, SOLUTION INTRAMUSCULAR; INTRAVENOUS at 11:40

## 2022-08-05 RX ADMIN — METHOCARBAMOL 1000 MG: 500 TABLET, FILM COATED ORAL at 09:05

## 2022-08-05 RX ADMIN — SODIUM CHLORIDE 1000 ML: 9 INJECTION, SOLUTION INTRAVENOUS at 09:08

## 2022-08-05 ASSESSMENT — PAIN SCALES - GENERAL: PAINLEVEL: 6

## 2022-08-05 NOTE — PROGRESS NOTES
Pt escorted by RN back to infusion suite, pt verified by name and , ID band placed.    Pt's pain level upon arrival to infusion suite 10. Pt gave verbal consent to obtain IV access and begin infusion treatment plan. Medication education given and all pt questions answered.     Infusion therapy started, see MAR and flowsheets for medication administration and VS.     Pt tolerated infusion with no complaints after treatment. Pain 5/10 at completion of treatment. One dose of IV toradol given per pt request. IV removed. AVS and infusion center info packet given to patient. Instructed pt to call office if they have any questions or concerns. Pt stable upon leaving infusion center, has ride home with .

## 2022-08-18 ENCOUNTER — TELEMEDICINE (OUTPATIENT)
Dept: NEUROLOGY | Facility: CLINIC | Age: 47
End: 2022-08-18
Payer: COMMERCIAL

## 2022-08-18 DIAGNOSIS — G43.711 INTRACTABLE CHRONIC MIGRAINE WITHOUT AURA AND WITH STATUS MIGRAINOSUS: Primary | ICD-10-CM

## 2022-08-18 DIAGNOSIS — G44.40 MEDICATION OVERUSE HEADACHE: ICD-10-CM

## 2022-08-18 DIAGNOSIS — G24.3 ISOLATED CERVICAL DYSTONIA: ICD-10-CM

## 2022-08-18 DIAGNOSIS — M54.2 CERVICALGIA: ICD-10-CM

## 2022-08-18 NOTE — PROGRESS NOTES
Verification of Patient Location:  The patient affirms they are currently located in the following state: [place]    Request for Consent:    Audio and Video Encounter   Hello, my name is Nupur Gandhi MD.  Before we proceed, can you please verify your identification by telling me your full name and date of birth?  Can you tell me who is in the room with you?    You and I are about to have a telemedicine check-in or visit because you have requested it.  This is a live video-conference.  I am a real person, speaking to you in real time.  There is no one else with me on the video-conference.  However, when we use ([employer], [employer], etc) it is important for you to know that the video-conference may not be secure or private.  I am not recording this conversation and I am asking you not to record it.  This telemedicine visit will be billed to your health insurance or you, if you are self-insured.  You understand you will be responsible for any copayments or coinsurances that apply to your telemedicine visit.  Communication platform used for this encounter:  [employer] Video Visit (with Zoom integration)     Before starting our telemedicine visit, I am required to get your consent for this virtual check-in or visit by telemedicine. Do you consent?      Patient Response to Request for Consent:  Yes      [facility]  Nupur Gandhi MD  [address] [place] [address] ([address])  [place] [address]       Patient ID: Smitha Cardozo    : 1975  MRN: [id number]                                            Visit Date: 2022  Encounter Provider: Nupur Gandhi  Referring Provider: No ref. provider found           Assessment/Plan   Problem List Items Addressed This Visit    None         Please message us via My Chart with any questions or concerns    Of note: Patient lives 2 hours from [place].    Neck pain:  Isolated cervical dystonia  -We will submit for Dysport for her neck  pain and spasticity.  Basic neck exercises for daily use:      1. Standing, drop your head to one side while continuing to look ahead. Hold for 10 seconds then swap sides. Repeat twice more each side. To increase the stretch, drop the opposite shoulder.    2. Standing again, lower your chin to your chest, hold for 10 and then look up to the ceiling and hold for 10. Repeat twice more.     3. Next, standing straight again, look over your right shoulder and hold firm for 10 seconds, then over your left shoulder for 10. Repeat this 3 times.     4. Finally, while sitting upright, bring your head forward and hold for 10, then all the way back and hold for 10.    If this simple exercise does not help improve the posture, we will consider formal physical therapy in the future.     Medication overuse headaches:  Chronic migraine headaches with and without aura  Preventive therapy for headaches:   -Patient is slowly weaning off of the amitriptyline.  Currently on 25 mg.  We will have her decrease this to 20 mg for 1 week then 10 mg for 1 week then stop.  - Start taking protriptyline 5 mg in a.m. starting now for 2 weeks then 10 mg in a.m. after that.  -Another option is Lamictal if patient fails protriptyline  -Continue Emgality 120 mg once a month.  -Bring patient in for injections and headache infusion for 2 to 3 days.  Abortive therapy for headaches:     - Using sumatriptan as an abortive and has used 25 tabs with then 6 weeks  - Using Ubrelvy as an abortive as well- has used 20 tabs within 6 weeks.    Over-the-counter medication:  Excedrin migraine about 15 a month  Advil at nighttime at least 4 times a week now.  - Still working on trying to slowly wean off of this.      Headache management instructions  - When patient has a moderate to severe headache, they should seek rest, initiate relaxation and apply cold compresses to the head.   - Maintain regular sleep schedule. Adults need at least 7-8 hours of uninterrupted  "sleep, per night.   - Limit over the counter medications such as Tylenol, Ibuprofen, Aleve, Excedrin. (No more than 2- 3 times a week or max 10 a month).  - Maintain headache diary.  Free ANTWON for a smart phone, which can be used is \"Migraine ammon\"  - Limit caffeine to 1-2 cups, 8 to 16 oz a day or less.  - Avoid dietary trigger. (aged cheese, peanuts, MSG, aspartame and nitrates).  - Patient is to have regular frequent meals to prevent headache onset.    - Please drink at least 64 ounces of water a day, to help remain hydrated.          No follow-ups on file.         _________________________________________________________________      Chief Complaint: No chief complaint on file.      Subjective     We had the pleasure of evaluating Smitha Cardozo in neurological follow up today. As you know she  is a 47 y.o.  years old  right handed female.  she is here today for evaluation of migraine headaches.  What kind of work do you do?    Personal history:  with 1 child    Medical history review:   QTC: none in chart to review  Hypertension  Nephrolithiasis 2022    Headache/pain history:  Headaches started at what age?  Started the age of 16 and started menstruation at age 12    What treatment have you had in the past or currently using for headaches/pain/mood?   Trigger point injection/Nerve block performed and how often? none  Epidural injections or trans foraminal injections performed?  yes for child birth only  Alternative therapies? massage, acupuncture and chiropractor  CBD or THC for your headaches and how often? NO   Other headache devices? NO   Botulinum toxin injection performed and how often? YES last Botox January 2019 x 2   Headache infusions:NO   Preventive medication therapy:   - Magnesium 400 mg, vitamin B2 400 mg,  - amitriptyline 50/100 mg (provided some help for headaches as she was on this for about 10 years or longer),   - Xanax 0.25,  - Metoprolol, verapamil (not sure why stopped use " this long time ago), amlodipine 5 mg, propanolol (hair loss),  - Topamax (stopped due to paresthesias/kidney stone), Lyrica, Depakote,  - Flexeril (bad dreams)  Abortive medication Therapy:   -  - Sumatriptan 100/50 mg p.o., sumatriptan injectable, Treximet, Cafergot,  - ibuprofen,  - Excedrin,  - Prednisone (caused palpitation),  - Ubrelvy 100mg     FOLLOW-UP CLINIC NOTE:  Last seen date: 6/15/2022  Last procedure date: None    Since last seen:  She did take Emgality and did have some side effects but nothing significant to report.  Overall states that she is tolerating Emgality and feels it does seem to be helping.  Has noted that a week before its due she seems to get more headaches and feels is wearing off.      What is your current pain level?   Headache: 1/10  Neck pain: 2/10    How often do the headaches/pain occur? daily  Mild headaches: 5 days a week a week before her second dose of Emgality she did have more mild headaches for 2 weeks.  Moderate to severe headaches:  -This last seen within the last 6 weeks she is only had 3 severe headaches.  -But still within the last 6 weeks she has used 25 sumatriptan hands, 20 Ubrelvy's.  - Did not think Nurtec was beneficial.  Neck pain: daily    How often do you take abortive medication for headache in a week?  Over-the-counter medication: Daily over-the-counter medication for years - Excedrin migraines one in am and one in the afternoon. Then at night time she takes Advil  -since last seen  - Used Advil at least 4 times a week.  - Use Excedrin at least 2-3 times a week.    Migraine specific medication: 50 mg to 100mg once a day daily or at time twice a day. Paying out of pocket for this.   Since last seen:  Sumatriptan use within the last 6 weeks-25 tabs  Ubrelvy use within the last 6 weeks-20 tabs    Are you ever headache free?     Aura/Warning and how long does it last?   Rare-scintillating Scotomas - blurred vision or sensation of a luminous appearance (daria  arc-shaped form, flickering, or shimmering) in front of  eyes with obstruction of vision.  45 minutes and occurs 4-6 times a year  Central Scotomas - blind spot that sits directly inline of sight x 1 and lasted for 30 minutes  Craving - sugar    What time of the day do headaches/pain start?  Mild headaches:  afternoon  Moderate to severe headaches: am or anytime of the day, worse when she wakes up with it  Neck pain: there all the time but pain intensity fluctuates throughout the day    How long do the headaches/pain last?   Mild headaches: few hours with med's  Moderate to severe headaches: with an hour it goes down to a lower number  Neck pain: there all the time but goes to a lower number with med's    Describe your usual headache/pain?   Mild headaches: Aching, pressure  Moderate to severe headaches: Pounding, throbbing  Neck pain: tightness and aching    Where is your headache/pain located?   Mild headaches: frontal, right orbital  Moderate to severe headaches: frontal and orbital  Neck pain: base of her head and radiates down her shoulders - right more then left    What is the intensity of pain?   Mild headaches: 4/10  Moderate to severe headaches:5- 6 - 10/10  Neck pain: 4 to 6/10    Associated symptoms with headache or neck pain:   - Decrease appetite, Nausea,  - Photophobia, Phonophobia  - Insomnia   - Nasal congestion  - Stiff or sore neck  - Light headed - 1-2 times a year  - Off balance   - Problems with concentration   -  better with lying down   - Prefer to be in a cool, quiet, dark room     Pain is worse are worse if the patient: , bending over  Triggers: Stress, tension, missing meals, weather change, alcohol, oversleeping, dehydration    Have you ever had any Brain imaging?  Yes  - I personally reviewed old notes over the last few months     8/31/2020-MRI of the brain with and without contrast at [facility]  EXAM MRI BRAIN W WO CONTRAST-8/31/2020 3:25 pm   HISTORY Headache, chronic, normal neuro exam    COMPARISON   CT HEAD WITHOUT CONTRAST dated 7/28/2017   TECHNIQUE   Multiplanar multi-sequence images of the brain were obtained without and with IV contrast   FINDINGS   There is no evidence of mass effect, midline shift, subacute intraparenchymal hemorrhage, or extra-axial fluid collections.   The ventricles are normal in size, shape, and configuration. Parenchymal volume is normal for age.   There is no evidence of abnormal restricted diffusion to suggest acute infarct.   Expected major vascular flow voids are seen.There is no evidence of abnormal enhancement.   No suprasellar masses are seen.   Cerebellar tonsils:  Normal. Clivus:  normal.   Visualized paranasal sinuses:  Mucosal thickening in the ethmoid and maxillary sinuses.   Visualized mastoid air cells:  Well aerated   10 x 7 mm T2 hyperintense lesion identified within the clivus, stable since the CT exam of 07/28/2000 17 May represent a cyst.  No pathological enhancement.   IMPRESSION   No intra-axial mass, hemorrhage, nor fluid collection.   10 mm likely cyst within the clivus.        Medications:   Current Outpatient Medications:   •  ALPRAZolam (XANAX) 0.25 mg tablet, alprazolam 0.25 mg tablet  TAKE 1 TABLET BY MOUTH DAILY AS NEEDED, Disp: , Rfl:   •  amitriptyline (ELAVIL) 10 mg tablet, 2 tabs at bedtime for 1 week then 1 tab at bedtime for 1 week then stop, Disp: 30 tablet, Rfl: 0  •  cyclobenzaprine (FLEXERIL) 5 mg tablet, 1-2 at bedtime nightly, Disp: 30 tablet, Rfl: 3  •  dexAMETHasone (DECADRON) 2 mg tablet, 1 in a.m. daily for the next 5 days, Disp: 5 tablet, Rfl: 0  •  divalproex (DEPAKOTE) 500 mg EC tablet, 1 at bedtime daily for 5 days, Disp: 5 tablet, Rfl: 0  •  galcanezumab-gnlm (EMGALITY) 120 mg/mL pen injector subcutaneous pen, Inject 1 mL (120 mg total) under the skin every 28 (twentyeight) days., Disp: 1 mL, Rfl: 3  •  irbesartan (AVAPRO) 75 mg tablet, Take 75 mg by mouth nightly., Disp: , Rfl:   •  protriptyline (VIVACTIL) 5 mg  tablet, 1 in a.m. for 2 weeks then 2 in a.m. after that, Disp: 60 tablet, Rfl: 3  •  SUMAtriptan (IMITREX) 50 mg tablet, sumatriptan 50 mg tablet  take 2 tablets by mouth AT ONSET OF MIGRAINE. MAY REPEAT IN 2 HOURS, Disp: , Rfl:   •  ubrogepant (UBRELVY) 100 mg tablet tablet, Ubrelvy 100 mg tablet, Disp: , Rfl:     Past Medical History:  has no past medical history on file.    Past Surgical History:  has no past surgical history on file.    Social History:      Family History: family history is not on file.    Allergies: is allergic to levofloxacin.     Review of Systems  All other systems reviewed and negative except as noted in the HPI.      Nupur Gandhi MD    Time Spent:  I spent 30 minutes on this date of service performing the following activities: obtaining history, entering orders, documenting, preparing for visit, obtaining / reviewing records, providing counseling and education, independently reviewing study/studies, communicating results and coordinating care.

## 2022-08-26 ENCOUNTER — TELEMEDICINE (OUTPATIENT)
Dept: NEUROLOGY | Facility: CLINIC | Age: 47
End: 2022-08-26
Payer: COMMERCIAL

## 2022-08-26 DIAGNOSIS — M54.2 CERVICALGIA: ICD-10-CM

## 2022-08-26 DIAGNOSIS — G24.3 ISOLATED CERVICAL DYSTONIA: ICD-10-CM

## 2022-08-26 DIAGNOSIS — G44.40 MEDICATION OVERUSE HEADACHE: ICD-10-CM

## 2022-08-26 DIAGNOSIS — G43.711 INTRACTABLE CHRONIC MIGRAINE WITHOUT AURA AND WITH STATUS MIGRAINOSUS: Primary | ICD-10-CM

## 2022-08-26 PROCEDURE — 99214 OFFICE O/P EST MOD 30 MIN: CPT | Mod: 95 | Performed by: PSYCHIATRY & NEUROLOGY

## 2022-08-26 RX ORDER — DIVALPROEX SODIUM 500 MG/1
TABLET, FILM COATED, EXTENDED RELEASE ORAL
Qty: 30 TABLET | Refills: 3 | Status: SHIPPED | OUTPATIENT
Start: 2022-08-26 | End: 2022-11-27 | Stop reason: SDUPTHER

## 2022-08-26 RX ORDER — SUMATRIPTAN SUCCINATE 100 MG/1
TABLET ORAL
Qty: 12 TABLET | Refills: 1
Start: 2022-08-26 | End: 2024-02-16

## 2022-08-26 RX ORDER — CANDESARTAN 8 MG/1
8 TABLET ORAL 2 TIMES DAILY
COMMUNITY
End: 2022-10-28 | Stop reason: SDUPTHER

## 2022-08-26 NOTE — PATIENT INSTRUCTIONS
Please message us via My Chart with any questions or concerns    Of note: Patient lives 2 hours from [place].    Anxiety:  - Is on Xanax 0.25 mg tablet which she has taken twice within the last week.    Neck pain:  Isolated cervical dystonia  -continue Dysport for her neck pain and spasticity.  Basic neck exercises for daily use:      1. Standing, drop your head to one side while continuing to look ahead. Hold for 10 seconds then swap sides. Repeat twice more each side. To increase the stretch, drop the opposite shoulder.    2. Standing again, lower your chin to your chest, hold for 10 and then look up to the ceiling and hold for 10. Repeat twice more.     3. Next, standing straight again, look over your right shoulder and hold firm for 10 seconds, then over your left shoulder for 10. Repeat this 3 times.     4. Finally, while sitting upright, bring your head forward and hold for 10, then all the way back and hold for 10.    If this simple exercise does not help improve the posture, we will consider formal physical therapy in the future.     Medication overuse headaches:  Chronic migraine headaches with and without aura  Preventive therapy for headaches:   -Currently on candesartan 8 mg twice daily for her high blood pressure  - Protriptyline 5 mg 1 in a.m. as when she went up to 10 mg she was having excessive sweating.  Is back down to taking 5 mg but has noted vivid dreams with this.  Has noted that since coming off the amitriptyline she is feeling that it affected her mood.  If mood continues to be a problem consider adding Cymbalta.  - Suggested that patient start taking Depakote 500 mg at bedtime nightly.  To see if this helps with sleep headache and mood.  - Continue Emgality 120 mg once a month.  Abortive therapy for headaches:  Since last seen patient has done significantly well coming off of her overuse of medication with Advil and Excedrin.  States for 2 weeks after headache infusion she took  "nothing over-the-counter or any triptans.  However last week when she developed COVID did have more migraine headache and thus has taken 6 sumatriptan over the last 3 weeks.  Took Ubrelvy once with no benefit.  Does not think Nurtec works.    Headache management instructions  - When patient has a moderate to severe headache, they should seek rest, initiate relaxation and apply cold compresses to the head.   - Maintain regular sleep schedule. Adults need at least 7-8 hours of uninterrupted sleep, per night.   - Limit over the counter medications such as Tylenol, Ibuprofen, Aleve, Excedrin. (No more than 2- 3 times a week or max 10 a month).  - Maintain headache diary.  Free ANTWON for a smart phone, which can be used is \"Migraine ammon\"  - Limit caffeine to 1-2 cups, 8 to 16 oz a day or less.  - Avoid dietary trigger. (aged cheese, peanuts, MSG, aspartame and nitrates).  - Patient is to have regular frequent meals to prevent headache onset.    - Please drink at least 64 ounces of water a day, to help remain hydrated.  "

## 2022-08-26 NOTE — PROGRESS NOTES
Verification of Patient Location:  The patient affirms they are currently located in the following state: [place]    Request for Consent:    Audio and Video Encounter   Hello, my name is Nupur Gandhi MD.  Before we proceed, can you please verify your identification by telling me your full name and date of birth?  Can you tell me who is in the room with you?    You and I are about to have a telemedicine check-in or visit because you have requested it.  This is a live video-conference.  I am a real person, speaking to you in real time.  There is no one else with me on the video-conference.  However, when we use ([employer], [employer], etc) it is important for you to know that the video-conference may not be secure or private.  I am not recording this conversation and I am asking you not to record it.  This telemedicine visit will be billed to your health insurance or you, if you are self-insured.  You understand you will be responsible for any copayments or coinsurances that apply to your telemedicine visit.  Communication platform used for this encounter:  [employer] Video Visit (with Zoom integration)     Before starting our telemedicine visit, I am required to get your consent for this virtual check-in or visit by telemedicine. Do you consent?      Patient Response to Request for Consent:  Yes      [facility]  Nupur Gandhi MD  [address] [place] [address] ([address])  [place] [id number]       Patient ID: Smitha Cardozo    : 1975  MRN: [id number]                                            Visit Date: 2022  Encounter Provider: Nupur Gandhi  Referring Provider: No ref. provider found           Assessment/Plan   Problem List Items Addressed This Visit        Nervous    Cervicalgia    Intractable chronic migraine without aura and with status migrainosus - Primary    Relevant Medications    SUMAtriptan (IMITREX) 100 mg tablet    divalproex (DEPAKOTE ER) 500 mg 24 hr  tablet    Isolated cervical dystonia    Relevant Medications    divalproex (DEPAKOTE ER) 500 mg 24 hr tablet    Medication overuse headache          Please message us via My Chart with any questions or concerns    Of note: Patient lives [place] hours from [place].    Anxiety:  - Is on Xanax 0.25 mg tablet which she has taken twice within the last week.    Neck pain:  Isolated cervical dystonia  -continue Dysport for her neck pain and spasticity.  Basic neck exercises for daily use:      1. Standing, drop your head to one side while continuing to look ahead. Hold for 10 seconds then swap sides. Repeat twice more each side. To increase the stretch, drop the opposite shoulder.    2. Standing again, lower your chin to your chest, hold for 10 and then look up to the ceiling and hold for 10. Repeat twice more.     3. Next, standing straight again, look over your right shoulder and hold firm for 10 seconds, then over your left shoulder for 10. Repeat this 3 times.     4. Finally, while sitting upright, bring your head forward and hold for 10, then all the way back and hold for 10.    If this simple exercise does not help improve the posture, we will consider formal physical therapy in the future.     Medication overuse headaches:  Chronic migraine headaches with and without aura  Preventive therapy for headaches:   -Currently on candesartan 8 mg twice daily for her high blood pressure  - Protriptyline 5 mg 1 in a.m. as when she went up to 10 mg she was having excessive sweating.  Is back down to taking 5 mg but has noted vivid dreams with this.  Has noted that since coming off the amitriptyline she is feeling that it affected her mood.  If mood continues to be a problem consider adding Cymbalta.  - Suggested that patient start taking Depakote 500 mg at bedtime nightly.  To see if this helps with sleep headache and mood.  - Continue Emgality 120 mg once a month.  Abortive therapy for headaches:  Since last seen patient  "has done significantly well coming off of her overuse of medication with Advil and Excedrin.  States for 2 weeks after headache infusion she took nothing over-the-counter or any triptans.  However last week when she developed COVID did have more migraine headache and thus has taken 6 sumatriptan over the last 3 weeks.  Took Ubrelvy once with no benefit.  Does not think Nurtec works.    Headache management instructions  - When patient has a moderate to severe headache, they should seek rest, initiate relaxation and apply cold compresses to the head.   - Maintain regular sleep schedule. Adults need at least 7-8 hours of uninterrupted sleep, per night.   - Limit over the counter medications such as Tylenol, Ibuprofen, Aleve, Excedrin. (No more than 2- 3 times a week or max 10 a month).  - Maintain headache diary.  Free ANTWON for a smart phone, which can be used is \"Migraine ammon\"  - Limit caffeine to 1-2 cups, 8 to 16 oz a day or less.  - Avoid dietary trigger. (aged cheese, peanuts, MSG, aspartame and nitrates).  - Patient is to have regular frequent meals to prevent headache onset.    - Please drink at least 64 ounces of water a day, to help remain hydrated.          No follow-ups on file.         _________________________________________________________________      Chief Complaint: No chief complaint on file.      Subjective     We had the pleasure of evaluating Smitha Cardozo in neurological follow up today. As you know she  is a 47 y.o.  years old  right handed female.  she is here today for evaluation of migraine headaches.  What kind of work do you do?    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  Started the age of 16 and started menstruation at age 12    What treatment have you had in the past or " currently using for headaches/pain/mood?   Trigger point injection/Nerve block performed and how often? none  Epidural injections or trans foraminal injections performed?  yes for child birth only  Alternative therapies? massage, acupuncture and chiropractor  CBD or THC for your headaches and how often? NO   Other headache devices? NO   Botulinum toxin injection performed and how often? YES last Botox January 2019 x 2   Headache infusions: Yes x 2 days August 2022  Preventive medication therapy:   - Magnesium 400 mg, vitamin B2 400 mg,  - amitriptyline 50/100 mg (provided some help for headaches as she was on this for about 10 years or longer),   - Xanax 0.25,  - Metoprolol, verapamil (not sure why stopped use this long time ago), amlodipine 5 mg, propanolol (hair loss),  - Topamax (stopped due to paresthesias/kidney stone), Lyrica, Depakote,  - Flexeril (bad dreams)  Abortive medication Therapy:   -  - Sumatriptan 100/50 mg p.o., sumatriptan injectable, Treximet, Cafergot,  - ibuprofen,  - Excedrin,  - Prednisone (caused palpitation),  - Ubrelvy 100mg     FOLLOW-UP CLINIC NOTE:  First date seen: 6/15/2022-last seen date: 7/21/2022  Last procedure date: 8/4/2022-Dysport    8/26/2022:  Since last seen she states she did well with a headache infusion and tolerated it well.  She did have some mild headaches for a few days after the headache infusion but overall all that did improve.  She has been very good with not taking any over-the-counter medication.  Last week unfortunately she developed COVID and due to this she did have headaches for which she ended up taking sumatriptan hand and had to use Advil.  She tried Nurtec with no benefit in her headaches.  Also did not think Ubrelvy completely took care of the headaches like the way sumatriptan does.    Medication used over the last 3 weeks:  - 6 triptans  -3 times Advil within the last 3 weeks.    What is your current pain level?   Headache: 2/10  Neck pain:  3/10    How often do the headaches/pain occur? daily  Mild headaches: sometimes but mild is there all the time.  Moderate to severe headaches: only one intense migraine and took 6 Imitrex over the last 3 weeks  Neck pain: daily    How often do you take abortive medication for headache in a week?  Over-the-counter medication: Daily over-the-counter medication for years - Excedrin migraines one in am and one in the afternoon. Then at night time she takes Advil  -since last seen  - Used Advil at least 4 times a week.  - Use Excedrin at least 2-3 times a week.    Migraine specific medication: 50 mg to 100mg once a day daily or at time twice a day. Paying out of pocket for this.   Since last seen:  Sumatriptan use within the last 6 weeks-25 tabs  Ubrelvy use within the last 6 weeks-20 tabs    Are you ever headache free?     Aura/Warning and how long does it last?   Rare-scintillating Scotomas - blurred vision or sensation of a luminous appearance (zigzag, arc-shaped form, flickering, or shimmering) in front of  eyes with obstruction of vision.  45 minutes and occurs 4-6 times a year  Central Scotomas - blind spot that sits directly inline of sight x 1 and lasted for 30 minutes  Craving - sugar    What time of the day do headaches/pain start?  Mild headaches:  afternoon  Moderate to severe headaches: am or anytime of the day, worse when she wakes up with it  Neck pain: there all the time but pain intensity fluctuates throughout the day    How long do the headaches/pain last?   Mild headaches: few hours with med's  Moderate to severe headaches: with an hour it goes down to a lower number  Neck pain: there all the time but goes to a lower number with med's    Describe your usual headache/pain?   Mild headaches: Aching, pressure  Moderate to severe headaches: Pounding, throbbing  Neck pain: tightness and aching    Where is your headache/pain located?   Mild headaches: frontal, right orbital  Moderate to severe headaches:  frontal and orbital  Neck pain: base of her head and radiates down her shoulders - right more then left    What is the intensity of pain?   Mild headaches: 4/10  Moderate to severe headaches:5- 6 - 10/10  Neck pain: 4 to 6/10    Associated symptoms with headache or neck pain:   - Decrease appetite, Nausea,  - Photophobia, Phonophobia  - Insomnia   - Nasal congestion  - Stiff or sore neck  - Light headed - 1-2 times a year  - Off balance   - Problems with concentration   -  better with lying down   - Prefer to be in a cool, quiet, dark room     Pain is worse are worse if the patient: , bending over  Triggers: Stress, tension, missing meals, weather change, alcohol, oversleeping, dehydration    Have you ever had any Brain imaging?  Yes  - I personally reviewed old notes over the last few months     8/31/2020-[facility] of the brain with and without contrast [facility]  EXAM MRI BRAIN W WO CONTRAST-8/31/2020 3:25 pm   HISTORY Headache, chronic, normal neuro exam   COMPARISON   CT HEAD WITHOUT CONTRAST dated 7/28/2017   TECHNIQUE   Multiplanar multi-sequence images of the brain were obtained without and with IV contrast   FINDINGS   There is no evidence of mass effect, midline shift, subacute intraparenchymal hemorrhage, or extra-axial fluid collections.   The ventricles are normal in size, shape, and configuration. Parenchymal volume is normal for age.   There is no evidence of abnormal restricted diffusion to suggest acute infarct.   Expected major vascular flow voids are seen.There is no evidence of abnormal enhancement.   No suprasellar masses are seen.   Cerebellar tonsils:  Normal. Clivus:  normal.   Visualized paranasal sinuses:  Mucosal thickening in the ethmoid and maxillary sinuses.   Visualized mastoid air cells:  Well aerated   10 x 7 mm T 2 hyperintense lesion identified within the clivus, stable since the CT exam of 07/28/2000 17 May represent a cyst.  No pathological enhancement.   IMPRESSION   No intra-axial  mass, hemorrhage, nor fluid collection.   10 mm likely cyst within the clivus.        Medications:   Current Outpatient Medications:   •  divalproex (DEPAKOTE ER) 500 mg 24 hr tablet, 1 tablet at bedtime nightly, Disp: 30 tablet, Rfl: 3  •  SUMAtriptan (IMITREX) 100 mg tablet, 1 at the onset of a migraine headache, may repeat dose once in 2hr if no relief. Do not exceed 2 doses in 24hr., Disp: 12 tablet, Rfl: 1  •  ALPRAZolam (XANAX) 0.25 mg tablet, alprazolam 0.25 mg tablet  TAKE 1 TABLET BY MOUTH DAILY AS NEEDED, Disp: , Rfl:   •  candesartan (ATACAND) 8 mg tablet, Take 8 mg by mouth 2 (two) times a day., Disp: , Rfl:   •  cyclobenzaprine (FLEXERIL) 5 mg tablet, 1-2 at bedtime nightly, Disp: 30 tablet, Rfl: 3  •  dexAMETHasone (DECADRON) 2 mg tablet, 1 in a.m. daily for the next 5 days, Disp: 5 tablet, Rfl: 0  •  divalproex (DEPAKOTE) 500 mg EC tablet, 1 at bedtime daily for 5 days, Disp: 5 tablet, Rfl: 0  •  galcanezumab-gnlm (EMGALITY) 120 mg/mL pen injector subcutaneous pen, Inject 1 mL (120 mg total) under the skin every 28 (twentyeight) days., Disp: 1 mL, Rfl: 3  •  protriptyline (VIVACTIL) 5 mg tablet, 1 in a.m. for 2 weeks then 2 in a.m. after that, Disp: 60 tablet, Rfl: 3    Past Medical History:  has no past medical history on file.    Past Surgical History:  has no past surgical history on file.    Social History:      Family History: family history is not on file.    Allergies: is allergic to levofloxacin.     Review of Systems  All other systems reviewed and negative except as noted in the HPI.      Nupur Gandhi MD    Time Spent:  I spent 30 minutes on this date of service performing the following activities: obtaining history, entering orders, documenting, preparing for visit, obtaining / reviewing records, providing counseling and education, independently reviewing study/studies, communicating results and coordinating care.

## 2022-09-29 ENCOUNTER — TELEMEDICINE (OUTPATIENT)
Dept: NEUROLOGY | Facility: CLINIC | Age: 47
End: 2022-09-29
Payer: COMMERCIAL

## 2022-09-29 DIAGNOSIS — E55.9 VITAMIN D DEFICIENCY: ICD-10-CM

## 2022-09-29 DIAGNOSIS — G44.40 MEDICATION OVERUSE HEADACHE: ICD-10-CM

## 2022-09-29 DIAGNOSIS — M54.2 CERVICALGIA: ICD-10-CM

## 2022-09-29 DIAGNOSIS — G24.3 ISOLATED CERVICAL DYSTONIA: ICD-10-CM

## 2022-09-29 DIAGNOSIS — G43.711 INTRACTABLE CHRONIC MIGRAINE WITHOUT AURA AND WITH STATUS MIGRAINOSUS: Primary | ICD-10-CM

## 2022-09-29 PROCEDURE — 99214 OFFICE O/P EST MOD 30 MIN: CPT | Mod: 95 | Performed by: PSYCHIATRY & NEUROLOGY

## 2022-09-29 RX ORDER — CANDESARTAN 16 MG/1
TABLET ORAL
COMMUNITY
Start: 2022-08-11

## 2022-09-29 RX ORDER — ALBUTEROL SULFATE 90 UG/1
INHALANT RESPIRATORY (INHALATION)
COMMUNITY
Start: 2022-09-09

## 2022-09-29 RX ORDER — DEXAMETHASONE 2 MG/1
TABLET ORAL
Qty: 5 TABLET | Refills: 0 | Status: SHIPPED | OUTPATIENT
Start: 2022-09-29 | End: 2022-10-28 | Stop reason: SDUPTHER

## 2022-09-29 RX ORDER — UBROGEPANT 100 MG/1
TABLET ORAL
COMMUNITY
Start: 2022-09-21

## 2022-09-29 RX ORDER — OLANZAPINE 5 MG/1
TABLET ORAL
Qty: 5 TABLET | Refills: 0 | Status: SHIPPED | OUTPATIENT
Start: 2022-09-29 | End: 2023-10-20

## 2022-09-29 NOTE — PATIENT INSTRUCTIONS
Please message us via My Chart with any questions or concerns    Of note: Patient lives in [facility].    Anxiety:  - Is on Xanax 0.25 mg tablet     Neck pain:  Isolated cervical dystonia  - continue Dysport for her neck pain and spasticity.  Feels Dysport may be wearing off as her neck pain and mobility is limited again.    Basic neck exercises for daily use:      1. Standing, drop your head to one side while continuing to look ahead. Hold for 10 seconds then swap sides. Repeat twice more each side. To increase the stretch, drop the opposite shoulder.    2. Standing again, lower your chin to your chest, hold for 10 and then look up to the ceiling and hold for 10. Repeat twice more.     3. Next, standing straight again, look over your right shoulder and hold firm for 10 seconds, then over your left shoulder for 10. Repeat this 3 times.     4. Finally, while sitting upright, bring your head forward and hold for 10, then all the way back and hold for 10.    If this simple exercise does not help improve the posture, we will consider formal physical therapy in the future.     Medication overuse headaches:  Chronic migraine headaches with and without aura  Preventive therapy for headaches:   - Currently on candesartan 8 mg twice daily for her high blood pressure -will be seeing her primary care physician on Friday.  Suggested that she talk to them about possibly also adding a beta-blocker such as propranolol or metoprolol to help with her migraine headaches.  Propanolol 10 to 20 mg twice a day or metoprolol 25 or 50 mg once a day.  - Unable to tolerate protriptyline thus weaned off of it.  Consider Cymbalta.  - Depakote 500 mg at bedtime nightly..  -  Emgality 120 mg once a month.  -For her weather related headaches and for difficulty sleeping recently.  Will have patient try Benadryl 25 mg at bedtime this should help with sleep and her weather related headaches.  Should only take it at bedtime during the  "seasons where the barometric pressure tends to fluctuate.  Abortive therapy for headaches:  At the onset of mild headache: Ubrelvy 100 mg seems to be helping.- Goal 2-week or less.    At the onset of moderate to severe headache: Sumatriptan- but goal is to decrease this again down to 2 a week.    To help break her current headache cycle. We will have patient do Decadron 2 mg in the morning for 5 days along with olanzapine 5 mg at bedtime for 5 days.  While taking this she may stop taking the Depakote 500 mg.  Once she is done with this she is to go back to doing Depakote 500 mg at bedtime.    Headache management instructions  - When patient has a moderate to severe headache, they should seek rest, initiate relaxation and apply cold compresses to the head.   - Maintain regular sleep schedule. Adults need at least 7-8 hours of uninterrupted sleep, per night.   - Limit over the counter medications such as Tylenol, Ibuprofen, Aleve, Excedrin. (No more than 2- 3 times a week or max 10 a month).  - Maintain headache diary.  Free ANTWON for a smart phone, which can be used is \"Migraine ammon\"  - Limit caffeine to 1-2 cups, 8 to 16 oz a day or less.  - Avoid dietary trigger. (aged cheese, peanuts, MSG, aspartame and nitrates).  - Patient is to have regular frequent meals to prevent headache onset.    - Please drink at least 64 ounces of water a day, to help remain hydrated.  "

## 2022-09-29 NOTE — PROGRESS NOTES
Verification of Patient Location:  The patient affirms they are currently located in the following state: [place]    Request for Consent:    Audio and Video Encounter   Hello, my name is Nupur Gandhi MD.  Before we proceed, can you please verify your identification by telling me your full name and date of birth?  Can you tell me who is in the room with you?    You and I are about to have a telemedicine check-in or visit because you have requested it.  This is a live video-conference.  I am a real person, speaking to you in real time.  There is no one else with me on the video-conference.  However, when we use ([employer], [employer], etc) it is important for you to know that the video-conference may not be secure or private.  I am not recording this conversation and I am asking you not to record it.  This telemedicine visit will be billed to your health insurance or you, if you are self-insured.  You understand you will be responsible for any copayments or coinsurances that apply to your telemedicine visit.  Communication platform used for this encounter:  [employer] Video Visit (with Zoom integration)     Before starting our telemedicine visit, I am required to get your consent for this virtual check-in or visit by telemedicine. Do you consent?      Patient Response to Request for Consent:  Yes      [facility]   Head[facility]  Nupur Gandhi MD  [address] [place] [address] ([address])  [place] [id number]       Patient ID: Smitha Cardozo    : 1975  MRN: [id number]                                            Visit Date: 2022  Encounter Provider: Nupur Gandhi  Referring Provider: No ref. provider found           Assessment/Plan   Problem List Items Addressed This Visit        Nervous    Cervicalgia    Isolated cervical dystonia    Medication overuse headache    Relevant Medications    dexAMETHasone (DECADRON) 2 mg tablet    OLANZapine (ZyPREXA) 5 mg tablet       Endocrine/Metabolic     Vitamin D deficiency       Other    Intractable chronic migraine without aura and with status migrainosus - Primary    Relevant Medications    UBRELVY 100 mg tablet tablet    dexAMETHasone (DECADRON) 2 mg tablet    OLANZapine (ZyPREXA) 5 mg tablet          Please message us via My Chart with any questions or concerns    Of note: Patient lives in [facility].    Anxiety:  - Is on Xanax 0.25 mg tablet     Neck pain:  Isolated cervical dystonia  Bruxism-currently using nightguard  -continue Dysport for her neck pain and spasticity.  Feels Dysport may be wearing off as her neck pain and mobility is limited again.    Basic neck exercises for daily use:      1. Standing, drop your head to one side while continuing to look ahead. Hold for 10 seconds then swap sides. Repeat twice more each side. To increase the stretch, drop the opposite shoulder.    2. Standing again, lower your chin to your chest, hold for 10 and then look up to the ceiling and hold for 10. Repeat twice more.     3. Next, standing straight again, look over your right shoulder and hold firm for 10 seconds, then over your left shoulder for 10. Repeat this 3 times.     4. Finally, while sitting upright, bring your head forward and hold for 10, then all the way back and hold for 10.    If this simple exercise does not help improve the posture, we will consider formal physical therapy in the future.     Medication overuse headaches:  Chronic migraine headaches with and without aura  Preventive therapy for headaches:   -Currently on candesartan 8 mg twice daily for her high blood pressure -will be seeing her primary care physician on Friday.  Suggested that she talk to them about possibly also adding a beta-blocker such as propranolol or metoprolol to help with her migraine headaches.  Propanolol 10 to 20 mg twice a day or metoprolol 25 or 50 mg once a day.  -Unable to tolerate protriptyline thus weaned off of it.  Consider Cymbalta.  - Depakote  "500 mg at bedtime nightly..  -  Emgality 120 mg once a month.  -For her weather related headaches and for difficulty sleeping recently.  Will have patient try Benadryl 25 mg at bedtime this should help with sleep and her weather related headaches.  Should only take it at bedtime during the seasons where the barometric pressure tends to fluctuate.  Abortive therapy for headaches:  At the onset of mild headache: Ubrelvy 100 mg seems to be helping.-Goal 2-week or less.    At the onset of moderate to severe headache: Sumatriptan-but goal is to decrease this again down to 2 a week.    To help break her current headache cycle. We will have patient do Decadron 2 mg in the morning for 5 days along with olanzapine 5 mg at bedtime for 5 days.  While taking this she may stop taking the Depakote 500 mg.  Once she is done with this she is to go back to doing Depakote 500 mg at bedtime.    Headache management instructions  - When patient has a moderate to severe headache, they should seek rest, initiate relaxation and apply cold compresses to the head.   - Maintain regular sleep schedule. Adults need at least 7-8 hours of uninterrupted sleep, per night.   - Limit over the counter medications such as Tylenol, Ibuprofen, Aleve, Excedrin. (No more than 2- 3 times a week or max 10 a month).  - Maintain headache diary.  Free ANTWON for a smart phone, which can be used is \"Migraine ammon\"  - Limit caffeine to 1-2 cups, 8 to 16 oz a day or less.  - Avoid dietary trigger. (aged cheese, peanuts, MSG, aspartame and nitrates).  - Patient is to have regular frequent meals to prevent headache onset.    - Please drink at least 64 ounces of water a day, to help remain hydrated.          No follow-ups on file.         _________________________________________________________________      Chief Complaint: No chief complaint on file.      Subjective     We had the pleasure of evaluating Smitha Pittmansamy in neurological follow up today. As you know she  " is a 47 y.o.  years old  right handed female.  she is here today for evaluation of migraine headaches.  What kind of work do you do?    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  Started the age of 16 and started menstruation at age 12    What treatment have you had in the past or currently using for headaches/pain/mood?   Trigger point injection/Nerve block performed and how often? none  Epidural injections or trans foraminal injections performed?  yes for child birth only  Alternative therapies? massage, acupuncture and chiropractor  CBD or THC for your headaches and how often? NO   Other headache devices? NO   Botulinum toxin injection performed and how often? YES last Botox January 2019 x 2   Headache infusions: Yes x 2 days August 2022  Preventive medication therapy:   - Magnesium 400 mg, vitamin B2 400 mg,  - amitriptyline 50/100 mg (provided some help for headaches as she was on this for about 10 years or longer),   - Xanax 0.25,  - Metoprolol, verapamil (not sure why stopped use this long time ago), amlodipine 5 mg, propanolol (hair loss),  - Topamax (stopped due to paresthesias/kidney stone), Lyrica, Depakote,  - Flexeril (bad dreams)  Abortive medication Therapy:   -  - Sumatriptan 100/50 mg p.o., sumatriptan injectable, Treximet, Cafergot,  - ibuprofen,  - Excedrin,  - Prednisone (caused palpitation),  - Ubrelvy 100mg     FOLLOW-UP CLINIC NOTE:  First date seen: 6/15/2022-last seen date: 8/26/2022  Last procedure date: 8/4/2022-Dysport    9/29/2022 -states that she is back to taking sumatriptan daily for the last 2 weeks.  She is not sure what exacerbated the headaches.  But has been waking up every morning with a moderate to severe headache.  Sumatriptan brings the headache down significantly but  does not completely take it away.  She try the Ubrelvy which does help but with only milder headaches not with more severe headaches.  She is also back to having neck pain on the right side with stiffness.      8/26/2022:  Since last seen she states she did well with a headache infusion and tolerated it well.  She did have some mild headaches for a few days after the headache infusion but overall all that did improve.  She has been very good with not taking any over-the-counter medication.  Last week unfortunately she developed COVID and due to this she did have headaches for which she ended up taking sumatriptan hand and had to use Advil.  She tried Nurtec with no benefit in her headaches.  Also did not think Ubrelvy completely took care of the headaches like the way sumatriptan does.  Medication used over the last 3 weeks:  - 6 triptans  -3 times Advil within the last 3 weeks.    What is your current pain level?   Headache: 3/10 - this morning it was 6/10  Neck pain: 5/10    How often do the headaches/pain occur? daily  Mild headaches: sometimes but mild is there all the time.  Moderate to severe headaches: only one intense migraine and took 6 Imitrex over the last 3 weeks  Neck pain: daily    How often do you take abortive medication for headache in a week?  Over-the-counter medication: Daily over-the-counter medication for years - Excedrin migraines one in am and one in the afternoon. Then at night time she takes Advil  -since last seen  - Used Advil at least 4 times a week.  - Use Excedrin at least 2-3 times a week.    Migraine specific medication: 50 mg to 100mg once a day daily or at time twice a day. Paying out of pocket for this.   Since last seen:  Sumatriptan use within the last 6 weeks-25 tabs  Ubrelvy use within the last 6 weeks-20 tabs    Are you ever headache free?     Aura/Warning and how long does it last?   Rare-scintillating Scotomas - blurred vision or sensation of a luminous appearance (daria  arc-shaped form, flickering, or shimmering) in front of  eyes with obstruction of vision.  45 minutes and occurs 4-6 times a year  Central Scotomas - blind spot that sits directly inline of sight x 1 and lasted for 30 minutes  Craving - sugar    What time of the day do headaches/pain start?  Mild headaches:  afternoon  Moderate to severe headaches: am or anytime of the day, worse when she wakes up with it  Neck pain: there all the time but pain intensity fluctuates throughout the day    How long do the headaches/pain last?   Mild headaches: few hours with med's  Moderate to severe headaches: with an hour it goes down to a lower number  Neck pain: there all the time but goes to a lower number with med's    Describe your usual headache/pain?   Mild headaches: Aching, pressure  Moderate to severe headaches: Pounding, throbbing  Neck pain: tightness and aching    Where is your headache/pain located?   Mild headaches: frontal, right orbital  Moderate to severe headaches: frontal and orbital  Neck pain: base of her head and radiates down her shoulders - right more then left    What is the intensity of pain?   Mild headaches: 4/10  Moderate to severe headaches:5- 6 - 10/10  Neck pain: 4 to 6/10    Associated symptoms with headache or neck pain:   - Decrease appetite, Nausea,  - Photophobia, Phonophobia  - Insomnia   - Nasal congestion  - Stiff or sore neck  - Light headed - 1-2 times a year  - Off balance   - Problems with concentration   -  better with lying down   - Prefer to be in a cool, quiet, dark room     Pain is worse are worse if the patient: , bending over  Triggers: Stress, tension, missing meals, weather change, alcohol, oversleeping, dehydration    Have you ever had any Brain imaging?  Yes    8/31/2020-MRI of the brain with and without contrast at [facility]  EXAM MRI BRAIN W WO CONTRAST-8/31/2020 3:25 pm   HISTORY Headache, chronic, normal neuro exam   COMPARISON   CT HEAD WITHOUT CONTRAST dated 7/28/2017    TECHNIQUE   Multiplanar multi-sequence images of the brain were obtained without and with IV contrast   FINDINGS   There is no evidence of mass effect, midline shift, subacute intraparenchymal hemorrhage, or extra-axial fluid collections.   The ventricles are normal in size, shape, and configuration. Parenchymal volume is normal for age.   There is no evidence of abnormal restricted diffusion to suggest acute infarct.   Expected major vascular flow voids are seen.There is no evidence of abnormal enhancement.   No suprasellar masses are seen.   Cerebellar tonsils:  Normal. Clivus:  normal.   Visualized paranasal sinuses:  Mucosal thickening in the ethmoid and maxillary sinuses.   Visualized mastoid air cells:  Well aerated   10 x 7 mm T 2 hyperintense lesion identified within the clivus, stable since the CT exam of 07/28/2000 17 May represent a cyst.  No pathological enhancement.   IMPRESSION   No intra-axial mass, hemorrhage, nor fluid collection.   10 mm likely cyst within the clivus.        Medications:   Current Outpatient Medications:     albuterol HFA (VENTOLIN HFA) 90 mcg/actuation inhaler, INHALE 2 PUFFS BY MOUTH EVERY 4 TO 6 HOURS, Disp: , Rfl:     ALPRAZolam (XANAX) 0.25 mg tablet, alprazolam 0.25 mg tablet  TAKE 1 TABLET BY MOUTH DAILY AS NEEDED, Disp: , Rfl:     candesartan (ATACAND) 16 mg tablet, , Disp: , Rfl:     candesartan (ATACAND) 8 mg tablet, Take 8 mg by mouth 2 (two) times a day., Disp: , Rfl:     cyclobenzaprine (FLEXERIL) 5 mg tablet, 1-2 at bedtime nightly, Disp: 30 tablet, Rfl: 3    dexAMETHasone (DECADRON) 2 mg tablet, One in a.m. daily with breakfast, Disp: 5 tablet, Rfl: 0    divalproex (DEPAKOTE ER) 500 mg 24 hr tablet, 1 tablet at bedtime nightly, Disp: 30 tablet, Rfl: 3    divalproex (DEPAKOTE) 500 mg EC tablet, 1 at bedtime daily for 5 days, Disp: 5 tablet, Rfl: 0    galcanezumab-gnlm (EMGALITY) 120 mg/mL pen injector subcutaneous pen, Inject 1 mL (120 mg total) under the  skin every 28 (twentyeight) days., Disp: 1 mL, Rfl: 3    OLANZapine (ZyPREXA) 5 mg tablet, One at bedtime nightly for 5 days, Disp: 5 tablet, Rfl: 0    SUMAtriptan (IMITREX) 100 mg tablet, 1 at the onset of a migraine headache, may repeat dose once in 2hr if no relief. Do not exceed 2 doses in 24hr., Disp: 12 tablet, Rfl: 1    UBRELVY 100 mg tablet tablet, , Disp: , Rfl:     dexAMETHasone (DECADRON) 2 mg tablet, 1 in a.m. daily for the next 5 days (Patient not taking: Reported on 9/29/2022), Disp: 5 tablet, Rfl: 0    Past Medical History:  has no past medical history on file.    Past Surgical History:  has no past surgical history on file.    Social History:      Family History: family history is not on file.    Allergies: is allergic to levofloxacin.     Review of Systems  All other systems reviewed and negative except as noted in the HPI.      Nupur Gadnhi MD    Time Spent:  I spent 30 minutes on this date of service performing the following activities: obtaining history, entering orders, documenting, preparing for visit, obtaining / reviewing records, providing counseling and education, independently reviewing study/studies, communicating results and coordinating care.

## 2022-10-27 RX ORDER — GALCANEZUMAB 120 MG/ML
INJECTION, SOLUTION SUBCUTANEOUS
Qty: 1 ML | Refills: 5 | Status: SHIPPED | OUTPATIENT
Start: 2022-10-27 | End: 2023-04-17 | Stop reason: SDUPTHER

## 2022-10-28 ENCOUNTER — OFFICE VISIT (OUTPATIENT)
Dept: NEUROLOGY | Facility: CLINIC | Age: 47
End: 2022-10-28
Payer: COMMERCIAL

## 2022-10-28 VITALS
HEART RATE: 86 BPM | DIASTOLIC BLOOD PRESSURE: 70 MMHG | TEMPERATURE: 97.7 F | SYSTOLIC BLOOD PRESSURE: 130 MMHG | OXYGEN SATURATION: 98 %

## 2022-10-28 DIAGNOSIS — G43.711 INTRACTABLE CHRONIC MIGRAINE WITHOUT AURA AND WITH STATUS MIGRAINOSUS: Primary | ICD-10-CM

## 2022-10-28 PROCEDURE — 200200 INJECTION PROCEDURES: Performed by: PSYCHIATRY & NEUROLOGY

## 2022-10-28 PROCEDURE — 99999 PR OFFICE/OUTPT VISIT,PROCEDURE ONLY: CPT | Performed by: PSYCHIATRY & NEUROLOGY

## 2022-10-28 PROCEDURE — 200200 PR NO CHARGE: Performed by: PSYCHIATRY & NEUROLOGY

## 2022-10-28 RX ORDER — PROPRANOLOL HYDROCHLORIDE 10 MG/1
20 TABLET ORAL
COMMUNITY
Start: 2022-10-25

## 2022-10-28 RX ORDER — LANOLIN ALCOHOL/MO/W.PET/CERES
400 CREAM (GRAM) TOPICAL DAILY
Qty: 90 TABLET | Refills: 1 | Status: SHIPPED | OUTPATIENT
Start: 2022-10-28 | End: 2024-12-11

## 2022-10-28 NOTE — PROGRESS NOTES
Injection Procedures    Date/Time: 10/28/2022 11:41 AM  Performed by: Nupur Gandhi MD  Authorized by: Nupur Gandhi MD     Consent:     Consent obtained:  Written    Consent given by:  Patient       [facility]  Nupur Gandhi MD  [address] [place] [address] ([address])  [place] [id number]         Indication: Isolated cervical dystonia/chronic migraine headache    Procedure details:     Position:  Upright    Botulinum toxin:     Type:  Type A    Brand: Dysport     Procedures:                 Right  injection amount:  12.5 units      Left  injection amount::  12.5 units      Procerus (midline) injection amount: 12.5 units        Right orbicularis oculi (lateral) injection amount:  12.5 units      Left orbicularis oculi (lateral) injection amount: 12.5 units        Right lateral frontalis injection amount:: 12.5 units      Right medial frontalis injection amount: 12.5 units     Left lateral frontalis injection amount: 12.5 units      Left medial frontalis injection amount:12.5 units        Right temporalis injection amount:  50 units      Left temporalis injection amount: 50 units     Right zygomatic complex injection amount: 6.25 units  Left zygomatic complex injection amount: 6.25 units      Right masseter muscle amount: 12.5 units    Left masseter muscle amount: 12.5 units       Right occipitalis injection amount:  37.5 units      Left occipitalis injection amount:  37.5 units        Right cervical paraspinal injection amount:  25 units      Left cervical paraspinal injection amount:  25 units        Right trapezius injection amount:  37.5 units      Left trapezius injection amount:  37.5 units       Right sternocleidomastoid injection amount: 12.5 units    Left sternocleidomastoid injection amount: 12.5 units      Right lower trapezius injection amount: 12.5  units     Left lower trapezius injection amount: 12.5 units       Total Units:     Total units used: 500  units    Total units discarded: 0 units       Post-procedure details:     Chemodenervation: Isolated cervical dystonia/chronic migraine headache    Patient tolerance of procedure:  Tolerated well, no immediate complications

## 2022-11-03 RX ORDER — CYCLOBENZAPRINE HCL 5 MG
TABLET ORAL
Qty: 60 TABLET | Refills: 6 | Status: SHIPPED | OUTPATIENT
Start: 2022-11-03 | End: 2023-08-08

## 2023-01-27 ENCOUNTER — OFFICE VISIT (OUTPATIENT)
Dept: NEUROLOGY | Facility: CLINIC | Age: 48
End: 2023-01-27
Payer: COMMERCIAL

## 2023-01-27 VITALS
SYSTOLIC BLOOD PRESSURE: 128 MMHG | DIASTOLIC BLOOD PRESSURE: 68 MMHG | HEART RATE: 98 BPM | TEMPERATURE: 98 F | OXYGEN SATURATION: 98 %

## 2023-01-27 DIAGNOSIS — G43.711 INTRACTABLE CHRONIC MIGRAINE WITHOUT AURA AND WITH STATUS MIGRAINOSUS: Primary | ICD-10-CM

## 2023-01-27 PROCEDURE — 64615 CHEMODENERV MUSC MIGRAINE: CPT | Performed by: PSYCHIATRY & NEUROLOGY

## 2023-01-27 PROCEDURE — 99999 PR OFFICE/OUTPT VISIT,PROCEDURE ONLY: CPT | Performed by: PSYCHIATRY & NEUROLOGY

## 2023-01-27 RX ORDER — TERCONAZOLE 4 MG/G
CREAM VAGINAL
COMMUNITY
End: 2023-10-20

## 2023-01-27 NOTE — PROGRESS NOTES
Injection Procedures    Date/Time: 1/27/2023 1:22 PM  Performed by: Nupur Gandhi MD  Authorized by: Nupur Gandhi MD     Consent:     Consent obtained:  Written    Consent given by:  Patient       [facility]  Nupur Gandhi MD  [address] [place] [address] ([address])  [place] [id number]         Indication: Isolated cervical dystonia/chronic migraine headache    Procedure details:     Position:  Upright    Botulinum toxin:     Type:  Type A    Brand: Dysport     Procedures:                 Right  injection amount:  12.5 units      Left  injection amount::  12.5 units      Procerus (midline) injection amount: 12.5 units        Right orbicularis oculi (lateral) injection amount:  12.5 units      Left orbicularis oculi (lateral) injection amount: 12.5 units        Right lateral frontalis injection amount:: 12.5 units      Right medial frontalis injection amount: 12.5 units     Left lateral frontalis injection amount: 12.5 units      Left medial frontalis injection amount:12.5 units        Right temporalis injection amount:  50 units      Left temporalis injection amount: 50 units     Right zygomatic complex injection amount: 6.5 units  Left zygomatic complex injection amount: 6.5 units      Right masseter muscle amount: 12.5 units    Left masseter muscle amount: 12.5 units       Right occipitalis injection amount:  37.5 units      Left occipitalis injection amount:  37.5 units        Right cervical paraspinal injection amount:  25 units      Left cervical paraspinal injection amount:  25 units        Right trapezius injection amount:  37.5 units      Left trapezius injection amount:  37.5 units       Right sternocleidomastoid injection amount: 12.5 units    Left sternocleidomastoid injection amount: 12.5 units      Right lower trapezius injection amount: 12.5  units divided into 2 regions    Left lower trapezius injection amount: 12.5 units divided into 2 regions        Total Units:     Total units used: 500 units    Total units discarded: 0 units       Post-procedure details:     Chemodenervation: Isolated cervical dystonia/chronic migraine headache    Patient tolerance of procedure:  Tolerated well, no immediate complications

## 2023-04-18 RX ORDER — GALCANEZUMAB 120 MG/ML
120 INJECTION, SOLUTION SUBCUTANEOUS
Qty: 1 ML | Refills: 5 | Status: SHIPPED | OUTPATIENT
Start: 2023-04-18 | End: 2024-02-16

## 2023-04-26 ENCOUNTER — OFFICE VISIT (OUTPATIENT)
Dept: NEUROLOGY | Facility: CLINIC | Age: 48
End: 2023-04-26
Payer: COMMERCIAL

## 2023-04-26 VITALS
HEART RATE: 78 BPM | DIASTOLIC BLOOD PRESSURE: 74 MMHG | TEMPERATURE: 97.6 F | OXYGEN SATURATION: 98 % | SYSTOLIC BLOOD PRESSURE: 124 MMHG | RESPIRATION RATE: 16 BRPM

## 2023-04-26 DIAGNOSIS — G24.3 ISOLATED CERVICAL DYSTONIA: Primary | ICD-10-CM

## 2023-04-26 PROCEDURE — 64615 CHEMODENERV MUSC MIGRAINE: CPT | Performed by: PSYCHIATRY & NEUROLOGY

## 2023-04-26 PROCEDURE — 99999 PR OFFICE/OUTPT VISIT,PROCEDURE ONLY: CPT | Performed by: PSYCHIATRY & NEUROLOGY

## 2023-04-26 NOTE — PROGRESS NOTES
Injection Procedures    Date/Time: 4/26/2023 9:14 AM    Performed by: Nupur Gandhi MD  Authorized by: Nupur Gandhi MD    Consent:     Consent obtained:  Written    Consent given by:  Patient       [facility]  Nupur Gandhi MD  [address] [place] [address] ([address])  [place] [id number]         Indication: Isolated cervical dystonia/chronic migraine headache    Procedure details:     Position:  Upright    Botulinum toxin:     Type:  Type A    Brand: Dysport     Procedures:                 Right  injection amount:  12.5 units      Left  injection amount::  12.5 units      Procerus (midline) injection amount: 12.5 units        Right orbicularis oculi (lateral) injection amount:  12.5 units      Left orbicularis oculi (lateral) injection amount: 12.5 units        Right lateral frontalis injection amount:: 12.5 units      Right medial frontalis injection amount: 12.5 units     Left lateral frontalis injection amount: 12.5 units      Left medial frontalis injection amount:12.5 units        Right temporalis injection amount:  50 units      Left temporalis injection amount: 50 units     Right zygomatic complex injection amount: 6.25 units  Left zygomatic complex injection amount: 6.25 units      Right masseter muscle amount: 12.5 units    Left masseter muscle amount: 12.5 units       Right occipitalis injection amount:  37.5 units      Left occipitalis injection amount:  37.5 units        Right cervical paraspinal injection amount:  25 units      Left cervical paraspinal injection amount:  25 units        Right trapezius injection amount:  37.5 units      Left trapezius injection amount:  37.5 units       Right sternocleidomastoid injection amount: 12.5 units    Left sternocleidomastoid injection amount: 12.5 units      Right lower trapezius injection amount: 12.5  units divided into 2 regions    Left lower trapezius injection amount: 12.5 units divided into 2 regions        Total Units:     Total units used: 500 units    Total units discarded: 0 units       Post-procedure details:     Chemodenervation: Isolated cervical dystonia/chronic migraine headache    Patient tolerance of procedure:  Tolerated well, no immediate complications

## 2023-05-03 ENCOUNTER — TELEPHONE (OUTPATIENT)
Dept: NEUROLOGY | Facility: CLINIC | Age: 48
End: 2023-05-03
Payer: COMMERCIAL

## 2023-05-03 NOTE — TELEPHONE ENCOUNTER
Hello,  Pt called to reschedule DYSPORT appointment, she wants to know if she can come in a week earlier. Can someone give her a call back please? Thank you

## 2023-05-16 DIAGNOSIS — G43.711 INTRACTABLE CHRONIC MIGRAINE WITHOUT AURA AND WITH STATUS MIGRAINOSUS: ICD-10-CM

## 2023-05-17 RX ORDER — DIVALPROEX SODIUM 500 MG/1
TABLET, FILM COATED, EXTENDED RELEASE ORAL
Qty: 90 TABLET | Refills: 1 | Status: SHIPPED | OUTPATIENT
Start: 2023-05-17 | End: 2023-11-16

## 2023-07-14 ENCOUNTER — OFFICE VISIT (OUTPATIENT)
Dept: NEUROLOGY | Facility: CLINIC | Age: 48
End: 2023-07-14
Attending: PSYCHIATRY & NEUROLOGY
Payer: COMMERCIAL

## 2023-07-14 VITALS — SYSTOLIC BLOOD PRESSURE: 138 MMHG | DIASTOLIC BLOOD PRESSURE: 92 MMHG | TEMPERATURE: 97.8 F

## 2023-07-14 DIAGNOSIS — G24.3 ISOLATED CERVICAL DYSTONIA: Primary | ICD-10-CM

## 2023-07-14 DIAGNOSIS — G43.711 INTRACTABLE CHRONIC MIGRAINE WITHOUT AURA AND WITH STATUS MIGRAINOSUS: ICD-10-CM

## 2023-07-14 PROCEDURE — 99999 PR OFFICE/OUTPT VISIT,PROCEDURE ONLY: CPT | Performed by: PSYCHIATRY & NEUROLOGY

## 2023-07-14 PROCEDURE — 64615 CHEMODENERV MUSC MIGRAINE: CPT | Performed by: PSYCHIATRY & NEUROLOGY

## 2023-07-14 RX ORDER — DEXAMETHASONE 2 MG/1
TABLET ORAL
Qty: 5 TABLET | Refills: 0 | Status: SHIPPED | OUTPATIENT
Start: 2023-07-14 | End: 2024-02-16

## 2023-07-14 NOTE — PROGRESS NOTES
Injection Procedures    Date/Time: 7/14/2023 10:18 AM    Performed by: Nupur Gandhi MD  Authorized by: Nupur Gandhi MD    Consent:     Consent obtained:  Written    Consent given by:  Patient       [facility]  Nupur Gandhi MD  [address] [place] [address] ([address])  [place] [id number]         Indication: Isolated cervical dystonia/chronic migraine headache    Procedure details:     Position:  Upright    Botulinum toxin:     Type:  Type A    Brand: Dysport     Procedures:                  Procerus (midline) injection amount: 12.5 units        Right lateral frontalis injection amount:: 12.5 units      Right medial frontalis injection amount: 12.5 units     Left lateral frontalis injection amount: 12.5 units      Left medial frontalis injection amount:12.5 units        Right temporalis injection amount:  50 units      Left temporalis injection amount: 50 units     Right zygomatic complex injection amount: 6.25 units  Left zygomatic complex injection amount: 6.25 units      Right masseter muscle amount: 12.5 units    Left masseter muscle amount: 12.5 units       Right occipitalis injection amount:  37.5 units      Left occipitalis injection amount:  37.5 units        Right cervical paraspinal injection amount:  25 units      Left cervical paraspinal injection amount:  25 units        Right trapezius injection amount:  37.5 units      Left trapezius injection amount:  37.5 units       Right sternocleidomastoid injection amount: 12.5 units    Left sternocleidomastoid injection amount: 12.5 units      Right lower trapezius injection amount: 12.5  units divided into 2 regions    Left lower trapezius injection amount: 12.5 units divided into 2 regions      50 units given in the apex of the head.     Total Units:     Total units used: 500 units    Total units discarded: 0 units       Post-procedure details:     Chemodenervation: Isolated cervical dystonia/chronic migraine headache    Patient  tolerance of procedure:  Tolerated well, no immediate complications

## 2023-08-08 RX ORDER — CYCLOBENZAPRINE HCL 5 MG
TABLET ORAL
Qty: 60 TABLET | Refills: 1 | Status: SHIPPED | OUTPATIENT
Start: 2023-08-08 | End: 2023-10-04

## 2023-10-04 DIAGNOSIS — M54.2 CERVICALGIA: Primary | ICD-10-CM

## 2023-10-04 RX ORDER — CYCLOBENZAPRINE HCL 5 MG
TABLET ORAL
Qty: 60 TABLET | Refills: 1 | Status: SHIPPED | OUTPATIENT
Start: 2023-10-04 | End: 2023-12-27

## 2023-10-19 NOTE — PROGRESS NOTES
Verification of Patient Location:  The patient affirms they are currently located in the following state: [place]    Request for Consent:    Audio and Video Encounter   Hello, my name is Nupur Gandhi MD.  Before we proceed, can you please verify your identification by telling me your full name and date of birth?  Can you tell me who is in the room with you?    You and I are about to have a telemedicine check-in or visit because you have requested it.  This is a live video-conference.  I am a real person, speaking to you in real time.  There is no one else with me on the video-conference.  However, when we use (Quart[employer], reQall, etc) it is important for you to know that the video-conference may not be secure or private.  I am not recording this conversation and I am asking you not to record it.  This telemedicine visit will be billed to your health insurance or you, if you are self-insured.  You understand you will be responsible for any copayments or coinsurances that apply to your telemedicine visit.  Communication platform used for this encounter:  [employer] Video Visit (with Zoom integration)     Before starting our telemedicine visit, I am required to get your consent for this virtual check-in or visit by telemedicine. Do you consent?      Patient Response to Request for Consent:Yes      [facility]   [facility]  Nupur Gandhi MD  [address] [place] [address] ([address])  [place] [id number]       Patient ID: Smitha Cardozo    : 1975  MRN: [id number]                                            Visit Date: 10/20/2023  Encounter Provider: Nupur Gandhi  Referring Provider: No ref. provider found           Assessment/Plan   Problem List Items Addressed This Visit        Nervous    Cervicalgia - Primary    Isolated cervical dystonia    Medication overuse headache       Endocrine/Metabolic    Vitamin D deficiency       Other    Intractable chronic migraine without aura and with  status migrainosus       Please message us via My Chart with any questions or concerns    Of note: Patient lives in [facility].    Anxiety:  - Xanax 0.25 mg tablet     Neck pain:  Isolated cervical dystonia  Bruxism-currently using nightguard  -Continue Dysport for her neck pain and spasticity.  -Continue neck exercises on your own on daily basis.   -Currently on cyclobenzaprine 5 mg at bedtime nightly.  Patient may start taking it as needed at bedtime.      Chronic tension type headache: Overall improved  Medication overuse headaches: Improved  Chronic migraine headaches with and without aura-overall improved has not had many over the last 3 months  Preventive therapy for headaches:   -On candesartan 8 mg twice daily-for hypertension  - Propanolol 20 mg at bedtime for migraine headaches.  Consider weaning patient off of it if patient continues to do well.  - For now continue Depakote 500 mg at bedtime.  Consider weaning patient off of it if patient continues to do well.  -Patient stopped Emgality injections currently.  -Unable to tolerate protriptyline thus weaned off of it.  Consider Cymbalta.  -Weather related headaches: Consider Benadryl 12.5 or 25 mg at bedtime as needed.  Abortive therapy for headaches:    At the onset of mild headache: Ubrelvy 100 mg seems to be helping.-Goal 2-week or less.    At the onset of moderate to severe headache: Sumatriptan-but goal is to decrease this again down to 2 a week.        Return in about 3 months (around 1/20/2024).         _________________________________________________________________      Chief Complaint: No chief complaint on file.      Subjective     We had the pleasure of evaluating Smitha Cardozo in neurological follow up today. As you know she  is a 48 y.o.  years old  right handed female.  she is here today for evaluation of migraine headaches.  Work history:   Personal history:  with 1 child-her  is a nurse at a correctional  facility    Medical history review:   QTC: none in chart to review  Hypertension  Nephrolithiasis 2022    Headache/pain history:  Headaches started at what age?  Started the age of 16 and started menstruation at age 12    Previous/current treatment for headaches/pain/mood:  CBD THC: None  Intervention procedure: None  Alternative therapies:massage, acupuncture and chiropractor  Headache devices: None  Trigger point injections/nerve blocks: None  Botulinum toxin: YES last Botox January 2019 x 2   Headache infusions: Yes x 2 days August 2022  Preventive medication therapy:   - Magnesium 400 mg, vitamin B2 400 mg,  - amitriptyline 50/100 mg (provided some help for headaches as she was on this for about 10 years or longer),   - Xanax 0.25,  - Metoprolol, verapamil (not sure why stopped use this long time ago), amlodipine 5 mg, propanolol (hair loss),  - Topamax (stopped due to paresthesias/kidney stone), Lyrica, Depakote,  - Flexeril (bad dreams)  Abortive medication Therapy:   - Sumatriptan 100/50 mg p.o., sumatriptan injectable, Treximet, Cafergot,  - ibuprofen,  - Excedrin,  - Prednisone (caused palpitation),  - Ubrelvy 100mg     FOLLOW-UP CLINIC NOTE:  First date seen: 6/15/2022-last seen date: 8/26/2022  Last procedure date: 8/4/2022-Dysport    8/26/2022: Since last seen she states she did well with a headache infusion and tolerated it well.  She did have some mild headaches for a few days after the headache infusion but overall all that did improve.  She has been very good with not taking any over-the-counter medication.  Last week unfortunately she developed COVID and due to this she did have headaches for which she ended up taking sumatriptan hand and had to use Advil.  She tried Nurtec with no benefit in her headaches.  Also did not think Ubrelvy completely took care of the headaches like the way sumatriptan does.  Medication used over the last 3 weeks:  - 6 triptans  -3 times Advil within the last 3  weeks.    9/29/2022 -states that she is back to taking sumatriptan daily for the last 2 weeks.  She is not sure what exacerbated the headaches.  But has been waking up every morning with a moderate to severe headache.  Sumatriptan brings the headache down significantly but does not completely take it away.  She try the Ubrelvy which does help but with only milder headaches not with more severe headaches.  She is also back to having neck pain on the right side with stiffness.      10/19/2023:/Seen patient states overall she is doing significantly well in regards to her tension type headache migraine headaches and neck pain.  She is also stopped taking the Emgality.    She wanted to touch base today in regards to her right eye swelling and pain.  States she is seeing an eye specialist locally who recently did biopsy after getting a CT of the orbit done.  As they were not sure if this was lymphoma or just lacrimal duct inflammation.  The biopsy came back to be negative.  They are also doing other blood work to identify the cause of patient's right lid swelling and pain.  States she does have a bit of a droopiness which is noted on the visit today.  They are more leaning towards possible autoimmune disease.  Patient also reports that she had few events of double vision when she woke up in the morning but it did not last long.  Suggested once the work-up is completed she could consider seeing [facility] for second opinion as well.    She had MRI of the brain done locally.  Which she will bring with her next time so we can review that with her on PACS.    What is your current pain level?   Headache: 0/10  Neck pain: 2/10    How often do the headaches/pain occur? daily  Mild headaches: sometimes but mild is there all the time.  10/19/2023:1-2 times a week or less    Moderate to severe headaches: only one intense migraine and took 6 Imitrex over the last 3 weeks  10/19/2023:none    Neck pain: daily  10/19/2023: stiffness no  pain    How often do you take abortive medication for headache in a week?  Over-the-counter medication: Daily over-the-counter medication for years - Excedrin migraines one in am and one in the afternoon. Then at night time she takes Advil  -since last seen  - Used Advil at least 4 times a week.  - Use Excedrin at least 2-3 times a week.   10/19/2023:  Excedrin: none  Advil: Back pain occasionally but not for headaches.      Migraine specific medication: 50 mg to 100mg once a day daily or at time twice a day. Paying out of pocket for this.   Since last seen:  Sumatriptan use within the last 6 weeks-25 tabs  Ubrelvy use within the last 6 weeks-20 tabs  10/19/2023:  Sumatriptan: none  Ubrelvy: none    What is the intensity of pain?   Mild headaches: 4/10  Moderate to severe headaches:5- 6 - 10/10  Neck pain: 4 to 6/10    How long do the headaches/pain last?   Mild headaches: few hours with med's  Moderate to severe headaches: with an hour it goes down to a lower number  Neck pain: there all the time but goes to a lower number with med's    Aura/Warning and how long does it last?   Rare-scintillating Scotomas - blurred vision or sensation of a luminous appearance (zigzag, arc-shaped form, flickering, or shimmering) in front of  eyes with obstruction of vision.  45 minutes and occurs 4-6 times a year  Central Scotomas - blind spot that sits directly inline of sight x 1 and lasted for 30 minutes  Craving - sugar    What time of the day do headaches/pain start?  Mild headaches:  afternoon  Moderate to severe headaches: am or anytime of the day, worse when she wakes up with it  Neck pain: there all the time but pain intensity fluctuates throughout the day    Describe your usual headache/pain?   Mild headaches: Aching, pressure  Moderate to severe headaches: Pounding, throbbing  Neck pain: tightness and aching    Where is your headache/pain located?   Mild headaches: frontal, right orbital  Moderate to severe headaches:  frontal and orbital  Neck pain: base of her head and radiates down her shoulders - right more then left    Associated symptoms with headache or neck pain:   - Decrease appetite, Nausea,  - Photophobia, Phonophobia  - Insomnia   - Nasal congestion  - Stiff or sore neck  - Light headed - 1-2 times a year  - Off balance   - Problems with concentration   -  better with lying down   - Prefer to be in a cool, quiet, dark room     Triggers: Stress, tension, missing meals, weather change, alcohol, oversleeping, dehydration    Imagin2020-MRI of the brain with and without contrast at [facility]  EXAM MRI BRAIN W WO CONTRAST-2020 3:25 pm   HISTORY Headache, chronic, normal neuro exam   COMPARISON   CT HEAD WITHOUT CONTRAST dated 2017   TECHNIQUE   Multiplanar multi-sequence images of the brain were obtained without and with IV contrast   FINDINGS   There is no evidence of mass effect, midline shift, subacute intraparenchymal hemorrhage, or extra-axial fluid collections.   The ventricles are normal in size, shape, and configuration. Parenchymal volume is normal for age.   There is no evidence of abnormal restricted diffusion to suggest acute infarct.   Expected major vascular flow voids are seen.There is no evidence of abnormal enhancement.   No suprasellar masses are seen.   Cerebellar tonsils:  Normal. Clivus:  normal.   Visualized paranasal sinuses:  Mucosal thickening in the ethmoid and maxillary sinuses.   Visualized mastoid air cells:  Well aerated   10 x 7 mm T 2 hyperintense lesion identified within the clivus, stable since the CT exam of 2000 17 May represent a cyst.  No pathological enhancement.   IMPRESSION   No intra-axial mass, hemorrhage, nor fluid collection.   10 mm likely cyst within the clivus.        Medications:   Current Outpatient Medications:     albuterol HFA (VENTOLIN HFA) 90 mcg/actuation inhaler, INHALE 2 PUFFS BY MOUTH EVERY 4 TO 6 HOURS, Disp: , Rfl:     ALPRAZolam  (XANAX) 0.25 mg tablet, alprazolam 0.25 mg tablet  TAKE 1 TABLET BY MOUTH DAILY AS NEEDED, Disp: , Rfl:     candesartan (ATACAND) 16 mg tablet, , Disp: , Rfl:     cyclobenzaprine (FLEXERIL) 5 mg tablet, TAKE 1-2 TABLETS BY MOUTH AT BEDTIME NIGHTLY, Disp: 60 tablet, Rfl: 1    dexAMETHasone (DECADRON) 2 mg tablet, 1 in a.m. daily for 3 days then as needed.  Take with food., Disp: 5 tablet, Rfl: 0    divalproex (DEPAKOTE) 500 mg 24 hr tablet, 1 TABLET AT BEDTIME NIGHTLY, Disp: 90 tablet, Rfl: 1    galcanezumab-gnlm (EMGALITY PEN) 120 mg/mL pen injector subcutaneous pen, Inject 1 mL (120 mg total) under the skin every 28 (twentyeight) days., Disp: 1 mL, Rfl: 5    magnesium oxide (MAG-OX) 400 mg (241.3 mg magnesium) tablet, Take 1 tablet (400 mg total) by mouth daily., Disp: 90 tablet, Rfl: 1    propranoloL (INDERAL) 10 mg tablet, 20 mg., Disp: , Rfl:     SUMAtriptan (IMITREX) 100 mg tablet, 1 at the onset of a migraine headache, may repeat dose once in 2hr if no relief. Do not exceed 2 doses in 24hr., Disp: 12 tablet, Rfl: 1    UBRELVY 100 mg tablet tablet, , Disp: , Rfl:     Review of Systems  All other systems reviewed and negative except as noted in the HPI.      Nupur Gandhi MD    Time Spent:  I spent 30 minutes on this date of service performing the following activities: obtaining history, entering orders, documenting, preparing for visit, obtaining / reviewing records, providing counseling and education, independently reviewing study/studies, communicating results and coordinating care.

## 2023-10-20 ENCOUNTER — TELEMEDICINE (OUTPATIENT)
Dept: NEUROLOGY | Facility: CLINIC | Age: 48
End: 2023-10-20
Payer: COMMERCIAL

## 2023-10-20 DIAGNOSIS — G44.40 MEDICATION OVERUSE HEADACHE: ICD-10-CM

## 2023-10-20 DIAGNOSIS — M54.2 CERVICALGIA: Primary | ICD-10-CM

## 2023-10-20 DIAGNOSIS — G24.3 ISOLATED CERVICAL DYSTONIA: ICD-10-CM

## 2023-10-20 DIAGNOSIS — G43.711 INTRACTABLE CHRONIC MIGRAINE WITHOUT AURA AND WITH STATUS MIGRAINOSUS: ICD-10-CM

## 2023-10-20 DIAGNOSIS — E55.9 VITAMIN D DEFICIENCY: ICD-10-CM

## 2023-10-20 PROCEDURE — 99214 OFFICE O/P EST MOD 30 MIN: CPT | Mod: 95 | Performed by: PSYCHIATRY & NEUROLOGY

## 2023-10-20 NOTE — PATIENT INSTRUCTIONS
Please message us via My Chart with any questions or concerns    Of note: Patient lives in [facility].    Anxiety:  - Xanax 0.25 mg tablet     Neck pain:  Isolated cervical dystonia  Bruxism-currently using nightguard  -Continue Dysport for her neck pain and spasticity.  -Continue neck exercises on your own on daily basis.   -Currently on cyclobenzaprine 5 mg at bedtime nightly.  Patient may start taking it as needed at bedtime.      Chronic tension type headache: Overall improved  Medication overuse headaches: Improved  Chronic migraine headaches with and without aura-overall improved has not had many over the last 3 months  Preventive therapy for headaches:   -On candesartan 8 mg twice daily-for hypertension  - Propanolol 20 mg at bedtime for migraine headaches.  Consider weaning patient off of it if patient continues to do well.  - For now continue Depakote 500 mg at bedtime.  Consider weaning patient off of it if patient continues to do well.  -Patient stopped Emgality injections currently.  -Unable to tolerate protriptyline thus weaned off of it.  Consider Cymbalta.  -Weather related headaches: Consider Benadryl 12.5 or 25 mg at bedtime as needed.  Abortive therapy for headaches:    At the onset of mild headache: Ubrelvy 100 mg seems to be helping.-Goal 2-week or less.    At the onset of moderate to severe headache: Sumatriptan-but goal is to decrease this again down to 2 a week.

## 2023-11-16 DIAGNOSIS — G43.711 INTRACTABLE CHRONIC MIGRAINE WITHOUT AURA AND WITH STATUS MIGRAINOSUS: ICD-10-CM

## 2023-11-16 RX ORDER — DIVALPROEX SODIUM 500 MG/1
TABLET, FILM COATED, EXTENDED RELEASE ORAL
Qty: 90 TABLET | Refills: 1 | Status: SHIPPED | OUTPATIENT
Start: 2023-11-16 | End: 2024-05-06

## 2023-12-23 DIAGNOSIS — M54.2 CERVICALGIA: ICD-10-CM

## 2023-12-27 RX ORDER — CYCLOBENZAPRINE HCL 5 MG
TABLET ORAL
Qty: 60 TABLET | Refills: 1 | Status: SHIPPED | OUTPATIENT
Start: 2023-12-27 | End: 2024-02-16

## 2024-02-15 NOTE — PROGRESS NOTES
[facility]   Headache Center  Nupur Gandhi MD  [address] ([address])  [place] [address]       Patient ID: Smitha Cardozo    : 1975  MRN: [id number]                                            Visit Date: 2024  Encounter Provider: Nupur Gandhi  Referring Provider: No ref. provider found           Assessment/Plan   Problem List Items Addressed This Visit        Nervous    Cervicalgia    Isolated cervical dystonia    Relevant Medications    divalproex (DEPAKOTE ER) 250 mg 24 hr ER tablet    Medication overuse headache       Other    Chronic migraine without aura without status migrainosus, not intractable - Primary    Relevant Medications    divalproex (DEPAKOTE ER) 250 mg 24 hr ER tablet    Intractable chronic migraine without aura and with status migrainosus    Relevant Medications    divalproex (DEPAKOTE ER) 250 mg 24 hr ER tablet    dexAMETHasone (DECADRON) 2 mg tablet       Please message us via My Chart with any questions or concerns    Of note: Patient lives in [facility].    Anxiety:  - Xanax 0.25 mg tablet     Neck pain:  Isolated cervical dystonia  Bruxism-currently using nightguard  -Continue neck exercises on your own on daily basis.   -Currently on cyclobenzaprine 5 mg at bedtime nightly.     Chronic tension type headache:   Medication overuse headaches:   Chronic migraine headaches with and without aura:    Preventive therapy for headaches:     - Magnesium 400 mg at bedtime nightly    - Propanolol 20 mg at bedtime for migraine headaches.  Consider weaning patient off of it if patient continues to do well.    - For now continue Depakote 500 mg at bedtime.  Will consider weaning off of this in April when patient comes back from vacation.  Will start with 250 mg at bedtime for 1 month and then will stop.  If headaches do return patient is to go back to taking 500 mg at bedtime nightly    -Weather related headaches: Consider Benadryl 12.5  or 25 mg at bedtime as needed.    -On candesartan 8 mg twice daily-for hypertension    Abortive therapy for headaches:    At the onset of mild headache: Ubrelvy 100 mg           Return in about 4 months (around 6/16/2024).         _________________________________________________________________      Chief Complaint: Follow-up      Subjective     We had the pleasure of evaluating Smitha Cardozo in neurological follow up today. As you know she  is a 48 y.o.  years old  right handed female.  she is here today for evaluation of migraine headaches.  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  Started the age of 16 and started menstruation at age 12    Previous/current treatment for headaches/pain/mood:  CBD THC: None  Intervention procedure: None  Alternative therapies:massage, acupuncture and chiropractor  Headache devices: None  Trigger point injections/nerve blocks: None  Botulinum toxin: YES last Botox January 2019 x 2   Headache infusions: Yes x 2 days August 2022  Preventive medication therapy:   - Magnesium 400 mg, vitamin B2 400 mg,  - amitriptyline 50/100 mg (provided some help for headaches as she was on this for about 10 years or longer),   - Xanax 0.25,  - Metoprolol, verapamil (not sure why stopped use this long time ago), amlodipine 5 mg, propanolol (hair loss),  - Topamax (stopped due to paresthesias/kidney stone), Lyrica, Depakote,  - Flexeril (bad dreams)  Abortive medication Therapy:   - Sumatriptan 100/50 mg p.o., sumatriptan injectable, Treximet, Cafergot,  - ibuprofen,  - Excedrin,  - Prednisone (caused palpitation),  - Ubrelvy 100mg     FOLLOW-UP CLINIC NOTE:  First date seen: 6/15/2022-last seen date: 8/26/2022  Last procedure date: 8/4/2022-Dysport    8/26/2022: Since last  seen she states she did well with a headache infusion and tolerated it well.  She did have some mild headaches for a few days after the headache infusion but overall all that did improve.  She has been very good with not taking any over-the-counter medication.  Last week unfortunately she developed COVID and due to this she did have headaches for which she ended up taking sumatriptan hand and had to use Advil.  She tried Nurtec with no benefit in her headaches.  Also did not think Ubrelvy completely took care of the headaches like the way sumatriptan does.  Medication used over the last 3 weeks:  - 6 triptans  -3 times Advil within the last 3 weeks.    9/29/2022 -states that she is back to taking sumatriptan daily for the last 2 weeks.  She is not sure what exacerbated the headaches.  But has been waking up every morning with a moderate to severe headache.  Sumatriptan brings the headache down significantly but does not completely take it away.  She try the Ubrelvy which does help but with only milder headaches not with more severe headaches.  She is also back to having neck pain on the right side with stiffness.      10/19/2023: Since last seen patient states overall she is doing significantly well in regards to her tension type headache migraine headaches and neck pain.  She is also stopped taking the Emgality.  She wanted to touch base today in regards to her right eye swelling and pain.  States she is seeing an eye specialist locally who recently did biopsy after getting a CT of the orbit done.  As they were not sure if this was lymphoma or just lacrimal duct inflammation.  The biopsy came back to be negative.  They are also doing other blood work to identify the cause of patient's right lid swelling and pain.  States she does have a bit of a droopiness which is noted on the visit today.  They are more leaning towards possible autoimmune disease.  Patient also reports that she had few events of double vision  when she woke up in the morning but it did not last long.  Suggested once the work-up is completed she could consider seeing [facility] for second opinion as well.  She had MRI of the brain done locally.  Which she will bring with her next time so we can review that with her on PACS.    2/16/2024: She has not had Dysport since summer 2023 and is off of Emgality since fall 2023.  States she continues to do well in regards to migraine headaches and tension type headaches.  Does tend to have more neck pain but it is not intense and not debilitating.  Wants to now try to come off of some of her preventative medication.      What is your current pain level?   Headache: 2/10  Neck pain: 4/10    How often do the headaches/pain occur? daily  Tension type headache: Sometimes but mild is there all the time.  10/19/2023:1-2 times a week or less  2/16/2024: one a week every other week    Migraine headache: Only one intense migraine and took 6 Imitrex over the last 3 weeks  10/19/2023:none  2/16/2024: none since last seen    Neck pain: daily  10/19/2023: stiffness no pain   2/16/2024: daily    How often do you take abortive medication for headache in a week?  Over-the-counter medication: Daily over-the-counter medication for years - Excedrin migraines one in am and one in the afternoon. Then at night time she takes Advil  -since last seen  - Used Advil at least 4 times a week.  - Use Excedrin at least 2-3 times a week.   10/19/2023:  Excedrin: none  Advil: Back pain occasionally but not for headaches.    2/16/2024: None     Migraine specific medication: 50 mg to 100mg once a day daily or at time twice a day. Paying out of pocket for this.   Since last seen:  Sumatriptan use within the last 6 weeks-25 tabs  Ubrelvy use within the last 6 weeks-20 tabs  10/19/2023:  Sumatriptan: none  Ubrelvy: none   2/16/2024: 1-2 a week or less    What is the intensity of pain?   Mild headaches: 4/10  12/16/2024 2-5/10    Moderate to severe  headaches:5- 6 - 10/10  2024: None    Neck pain: 4 to 6/10  2024: 4-5/10    How long do the headaches/pain last?   Mild headaches: few hours with med's  Moderate to severe headaches: with an hour it goes down to a lower number  Neck pain: there all the time but goes to a lower number with med's    What time of the day do headaches/pain start?  Mild headaches:  afternoon  Moderate to severe headaches: am or anytime of the day, worse when she wakes up with it  Neck pain: there all the time but pain intensity fluctuates throughout the day    Describe your usual headache/pain?   Mild headaches: Aching, pressure  Moderate to severe headaches: Pounding, throbbing  Neck pain: tightness and aching    Where is your headache/pain located?   Mild headaches: frontal, right orbital  Moderate to severe headaches: frontal and orbital  Neck pain: base of her head and radiates down her shoulders - right more then left    Aura/Warning and how long does it last?   Rare-scintillating Scotomas - blurred vision or sensation of a luminous appearance (zigzag, arc-shaped form, flickering, or shimmering) in front of  eyes with obstruction of vision.  45 minutes and occurs 4-6 times a year  Central Scotomas - blind spot that sits directly inline of sight x 1 and lasted for 30 minutes  Craving - sugar    Associated symptoms with headache or neck pain:   - Decrease appetite, Nausea,  - Photophobia, Phonophobia  - Insomnia   - Nasal congestion  - Stiff or sore neck  - Light headed - 1-2 times a year  - Off balance   - Problems with concentration   -  better with lying down   - Prefer to be in a cool, quiet, dark room     Triggers: Stress, tension, missing meals, weather change, alcohol, oversleeping, dehydration    [email or web address]  EXAM MRI BRAIN W WO CONTRAST-2020 3:25 pm   HISTORY Headache, chronic, normal neuro exam   COMPARISON   CT HEAD WITHOUT CONTRAST dated  7/28/2017   TECHNIQUE   Multiplanar multi-sequence images of the brain were obtained without and with IV contrast   FINDINGS   There is no evidence of mass effect, midline shift, subacute intraparenchymal hemorrhage, or extra-axial fluid collections.   The ventricles are normal in size, shape, and configuration. Parenchymal volume is normal for age.   There is no evidence of abnormal restricted diffusion to suggest acute infarct.   Expected major vascular flow voids are seen.There is no evidence of abnormal enhancement.   No suprasellar masses are seen.   Cerebellar tonsils:  Normal. Clivus:  normal.   Visualized paranasal sinuses:  Mucosal thickening in the ethmoid and maxillary sinuses.   Visualized mastoid air cells:  Well aerated   10 x 7 mm T 2 hyperintense lesion identified within the clivus, stable since the CT exam of 07/28/2000 17 May represent a cyst.  No pathological enhancement.   IMPRESSION   No intra-axial mass, hemorrhage, nor fluid collection.   10 mm likely cyst within the clivus.        Medications:   Current Outpatient Medications:   •  albuterol HFA (VENTOLIN HFA) 90 mcg/actuation inhaler, INHALE 2 PUFFS BY MOUTH EVERY 4 TO 6 HOURS, Disp: , Rfl:   •  candesartan (ATACAND) 16 mg tablet, , Disp: , Rfl:   •  cyclobenzaprine (FLEXERIL) 5 mg tablet, TAKE 1-2 TABLETS BY MOUTH AT BEDTIME NIGHTLY, Disp: 60 tablet, Rfl: 1  •  dexAMETHasone (DECADRON) 2 mg tablet, 1 in a.m. daily for 3 days then as needed.  Take with food., Disp: 5 tablet, Rfl: 0  •  divalproex (DEPAKOTE ER) 250 mg 24 hr ER tablet, 1 at bedtime nightly for 30 days, Disp: 30 tablet, Rfl: 0  •  divalproex (DEPAKOTE ER) 500 mg 24 hr ER tablet, 1 TABLET AT BEDTIME NIGHTLY, Disp: 90 tablet, Rfl: 1  •  magnesium oxide (MAG-OX) 400 mg (241.3 mg magnesium) tablet, Take 1 tablet (400 mg total) by mouth daily., Disp: 90 tablet, Rfl: 1  •  propranoloL (INDERAL) 10 mg tablet, 20 mg., Disp: , Rfl:   •  UBRELVY 100 mg tablet tablet, , Disp: , Rfl:   •   ALPRAZolam (XANAX) 0.25 mg tablet, alprazolam 0.25 mg tablet  TAKE 1 TABLET BY MOUTH DAILY AS NEEDED, Disp: , Rfl:     Review of Systems  All other systems reviewed and negative except as noted in the HPI.      Nupur Gandhi MD    Time Spent:    I spent 30 minutes on this date of service performing the following activities: obtaining history, entering orders, documenting, preparing for visit, obtaining / reviewing records, providing counseling and education, independently reviewing study/studies, communicating results and coordinating care.

## 2024-02-16 ENCOUNTER — OFFICE VISIT (OUTPATIENT)
Dept: NEUROLOGY | Facility: CLINIC | Age: 49
End: 2024-02-16
Payer: COMMERCIAL

## 2024-02-16 VITALS
TEMPERATURE: 98.3 F | OXYGEN SATURATION: 98 % | RESPIRATION RATE: 18 BRPM | DIASTOLIC BLOOD PRESSURE: 86 MMHG | HEART RATE: 86 BPM | SYSTOLIC BLOOD PRESSURE: 132 MMHG

## 2024-02-16 DIAGNOSIS — G43.711 INTRACTABLE CHRONIC MIGRAINE WITHOUT AURA AND WITH STATUS MIGRAINOSUS: ICD-10-CM

## 2024-02-16 DIAGNOSIS — G43.709 CHRONIC MIGRAINE WITHOUT AURA WITHOUT STATUS MIGRAINOSUS, NOT INTRACTABLE: Primary | ICD-10-CM

## 2024-02-16 DIAGNOSIS — M54.2 CERVICALGIA: ICD-10-CM

## 2024-02-16 DIAGNOSIS — G44.40 MEDICATION OVERUSE HEADACHE: ICD-10-CM

## 2024-02-16 DIAGNOSIS — G24.3 ISOLATED CERVICAL DYSTONIA: ICD-10-CM

## 2024-02-16 PROCEDURE — 3075F SYST BP GE 130 - 139MM HG: CPT | Performed by: PSYCHIATRY & NEUROLOGY

## 2024-02-16 PROCEDURE — 99214 OFFICE O/P EST MOD 30 MIN: CPT | Performed by: PSYCHIATRY & NEUROLOGY

## 2024-02-16 PROCEDURE — 3079F DIAST BP 80-89 MM HG: CPT | Performed by: PSYCHIATRY & NEUROLOGY

## 2024-02-16 RX ORDER — DEXAMETHASONE 2 MG/1
TABLET ORAL
Qty: 5 TABLET | Refills: 0 | Status: SHIPPED | OUTPATIENT
Start: 2024-02-16 | End: 2024-04-26 | Stop reason: SDUPTHER

## 2024-02-16 RX ORDER — CYCLOBENZAPRINE HCL 5 MG
TABLET ORAL
Qty: 60 TABLET | Refills: 1 | Status: SHIPPED | OUTPATIENT
Start: 2024-02-16 | End: 2024-04-15

## 2024-02-16 RX ORDER — DIVALPROEX SODIUM 250 MG/1
TABLET, FILM COATED, EXTENDED RELEASE ORAL
Qty: 30 TABLET | Refills: 0 | Status: SHIPPED | OUTPATIENT
Start: 2024-02-16 | End: 2024-03-18

## 2024-02-16 NOTE — PATIENT INSTRUCTIONS
Please message us via My Chart with any questions or concerns    Anxiety:  - Xanax 0.25 mg tablet     Neck pain:  Isolated cervical dystonia  Bruxism-currently using nightguard  -Continue neck exercises on your own on daily basis.   -Currently on cyclobenzaprine 5 mg at bedtime nightly.     Chronic tension type headache:   Medication overuse headaches:   Chronic migraine headaches with and without aura:    Preventive therapy for headaches:     - Magnesium 400 mg at bedtime nightly    - Propanolol 20 mg at bedtime for migraine headaches.  Consider weaning patient off of it if patient continues to do well.    - For now continue Depakote 500 mg at bedtime.  Will consider weaning off of this in April when patient comes back from vacation.  Will start with 250 mg at bedtime for 1 month and then will stop.  If headaches do return patient is to go back to taking 500 mg at bedtime nightly    -Weather related headaches: Consider Benadryl 12.5 or 25 mg at bedtime as needed.    -On candesartan 8 mg twice daily-for hypertension    Abortive therapy for headaches:    At the onset of mild headache: Ubrelvy 100 mg

## 2024-03-18 DIAGNOSIS — G43.709 CHRONIC MIGRAINE WITHOUT AURA WITHOUT STATUS MIGRAINOSUS, NOT INTRACTABLE: ICD-10-CM

## 2024-03-18 RX ORDER — DIVALPROEX SODIUM 250 MG/1
TABLET, FILM COATED, EXTENDED RELEASE ORAL
Qty: 30 TABLET | Refills: 0 | Status: SHIPPED | OUTPATIENT
Start: 2024-03-18 | End: 2024-06-14

## 2024-04-13 DIAGNOSIS — M54.2 CERVICALGIA: ICD-10-CM

## 2024-04-15 RX ORDER — CYCLOBENZAPRINE HCL 5 MG
TABLET ORAL
Qty: 60 TABLET | Refills: 1 | Status: SHIPPED | OUTPATIENT
Start: 2024-04-15 | End: 2024-06-07

## 2024-05-04 DIAGNOSIS — G43.711 INTRACTABLE CHRONIC MIGRAINE WITHOUT AURA AND WITH STATUS MIGRAINOSUS: ICD-10-CM

## 2024-05-06 RX ORDER — DIVALPROEX SODIUM 500 MG/1
TABLET, FILM COATED, EXTENDED RELEASE ORAL
Qty: 90 TABLET | Refills: 1 | Status: SHIPPED | OUTPATIENT
Start: 2024-05-06 | End: 2024-08-09

## 2024-06-07 DIAGNOSIS — M54.2 CERVICALGIA: ICD-10-CM

## 2024-06-07 RX ORDER — CYCLOBENZAPRINE HCL 5 MG
TABLET ORAL
Qty: 60 TABLET | Refills: 1 | Status: SHIPPED | OUTPATIENT
Start: 2024-06-07 | End: 2024-08-15

## 2024-06-13 NOTE — PROGRESS NOTES
[facility]   Headache Center  Nupur Gandhi MD  [address] ([address])  [place] [address]       Patient ID: Smitha Cardozo    : 1975  MRN: [id number]                                            Visit Date: 2024  Encounter Provider: Nupur Gandhi  Referring Provider: No ref. provider found           Assessment/Plan   Problem List Items Addressed This Visit          Nervous    Cervicalgia - Primary    Isolated cervical dystonia    Medication overuse headache       Mental Health    Generalized anxiety disorder       Other    Chronic migraine without aura without status migrainosus, not intractable    Intractable chronic migraine without aura and with status migrainosus         Please message us via My Chart with any questions or concerns    Of note: Patient lives in [facility].    Anxiety:  - Xanax 0.25 mg tablet     Neck pain:  Isolated cervical dystonia  Bruxism-currently using nightguard  -Continue neck exercises on your own on daily basis.   -Currently on cyclobenzaprine 5 mg at bedtime nightly.     Chronic tension type headache:   Medication overuse headaches:   Chronic migraine headaches with and without aura:    Preventive therapy for headaches:     - Magnesium oxide or glycinate 400 mg at bedtime nightly    - For now continue Depakote 500 mg at bedtime.      - Propanolol 20 mg at bedtime for migraine headaches.  Consider weaning patient off of it by taking it once a day for a week then stop.     - Weather related headaches: Consider Benadryl 12.5 or 25 mg at bedtime as needed.    - On candesartan 8 mg twice daily-for hypertension    Abortive therapy for headaches:    At the onset of mild headache: Ubrelvy 100 mg           No follow-ups on file.         _________________________________________________________________      Chief Complaint: Follow-up      Subjective     We had the pleasure of evaluating Smitha Cardozo in neurological follow up  today. As you know she  is a 48 y.o.  years old  right handed female.  she is here today for evaluation of migraine headaches.  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  Started the age of 16 and started menstruation at age 12    Previous/current treatment for headaches/pain/mood:  CBD THC: None  Intervention procedure: None  Alternative therapies:massage, acupuncture and chiropractor  Headache devices: None  Trigger point injections/nerve blocks: None  Botulinum toxin: YES last Botox January 2019 x 2   Headache infusions: Yes x 2 days August 2022  Preventive medication therapy:   - Magnesium 400 mg, vitamin B2 400 mg,  - amitriptyline 50/100 mg (provided some help for headaches as she was on this for about 10 years or longer),   - Xanax 0.25,  - Metoprolol, verapamil (not sure why stopped use this long time ago), amlodipine 5 mg, propanolol (hair loss),  - Topamax (stopped due to paresthesias/kidney stone), Lyrica, Depakote,  - Flexeril (bad dreams)  Abortive medication Therapy:   - Sumatriptan 100/50 mg p.o., sumatriptan injectable, Treximet, Cafergot,  - ibuprofen,  - Excedrin,  - Prednisone (caused palpitation),  - Ubrelvy 100mg     FOLLOW-UP CLINIC NOTE:  First date seen: 6/15/2022-last seen date: 8/26/2022  Last procedure date: 8/4/2022-Dysport    8/26/2022: Since last seen she states she did well with a headache infusion and tolerated it well.  She did have some mild headaches for a few days after the headache infusion but overall all that did improve.  She has been very good with not taking any over-the-counter medication.  Last week unfortunately she developed COVID and due to this she did have headaches for which she ended up taking sumatriptan hand and had to use Advil.  She tried  Nurtec with no benefit in her headaches.  Also did not think Ubrelvy completely took care of the headaches like the way sumatriptan does.  Medication used over the last 3 weeks:  - 6 triptans  -3 times Advil within the last 3 weeks.    9/29/2022 -states that she is back to taking sumatriptan daily for the last 2 weeks.  She is not sure what exacerbated the headaches.  But has been waking up every morning with a moderate to severe headache.  Sumatriptan brings the headache down significantly but does not completely take it away.  She try the Ubrelvy which does help but with only milder headaches not with more severe headaches.  She is also back to having neck pain on the right side with stiffness.      10/19/2023: Since last seen patient states overall she is doing significantly well in regards to her tension type headache migraine headaches and neck pain.  She is also stopped taking the Emgality.  She wanted to touch base today in regards to her right eye swelling and pain.  States she is seeing an eye specialist locally who recently did biopsy after getting a CT of the orbit done.  As they were not sure if this was lymphoma or just lacrimal duct inflammation.  The biopsy came back to be negative.  They are also doing other blood work to identify the cause of patient's right lid swelling and pain.  States she does have a bit of a droopiness which is noted on the visit today.  They are more leaning towards possible autoimmune disease.  Patient also reports that she had few events of double vision when she woke up in the morning but it did not last long.  Suggested once the work-up is completed she could consider seeing [facility] eye for second opinion as well.  She had MRI of the brain done locally.  Which she will bring with her next time so we can review that with her on PACS.    2/16/2024: She has not had Dysport since summer 2023 and is off of Emgality since fall 2023.  States she continues to do well in regards to  migraine headaches and tension type headaches.  Does tend to have more neck pain but it is not intense and not debilitating.  Wants to now try to come off of some of her preventative medication.      6/14/2024: Patient messaged April 26 as she noticed increase in the uptake of her migraine headaches.  At that time she started back on Depakote 250 mg and Decadron 2 mg was called in for 5 days.  She went down on the Depakote 250 mg and did go back up to 500mg in 2 week.  Has been doing well in regards to her headaches since then.      What is your current pain level?   Headache: 1/10 this am and took Ubrelvy. Now it is 0/10  Neck pain:no pain stiffness of 4/10    How often do the headaches/pain occur? daily  Tension type headache: Sometimes but mild is there all the time.  10/19/2023:1-2 times a week or less  2/16/2024: one a week every other week  6/14/2024:2 times    Migraine headache: Only one intense migraine and took 6 Imitrex over the last 3 weeks  10/19/2023:none  2/16/2024: none since last seen  6/14/2024: none    Neck pain: daily  10/19/2023: stiffness no pain  2/16/2024: daily  6/14/2024:stiffness is daily    How often do you take abortive medication for headache in a week?  Over-the-counter medication: Daily over-the-counter medication for years - Excedrin migraines one in am and one in the afternoon. Then at night time she takes Advil  -since last seen  - Used Advil at least 4 times a week.  - Use Excedrin at least 2-3 times a week.   10/19/2023  Excedrin: none  Advil: Back pain occasionally but not for headaches.    2/16/2024: None  6/14/2024:Excedrin rare, Advil for other pain.      Migraine specific medication: 50 mg to 100mg once a day daily or at time twice a day. Paying out of pocket for this.   Since last seen:  Sumatriptan use within the last 6 weeks-25 tabs  Ubrelvy use within the last 6 weeks-20 tabs  10/19/2023:  Sumatriptan: none  Ubrelvy: none   2/16/2024: 1-2 a week or less  6/14/2024:  Ubrelvy 2 a week she may go with a stretch with non    What is the intensity of pain?   Mild headaches: 4/10  2024 2-5/10    Moderate to severe headaches:5- 6 - 10/10  2024: None    Neck pain: 4 to 610  2024: 4-5/10    How long do the headaches/pain last?   Mild headaches: few hours with med's  Moderate to severe headaches: with an hour it goes down to a lower number  Neck pain: there all the time but goes to a lower number with med's    What time of the day do headaches/pain start?  Mild headaches:  afternoon  Moderate to severe headaches: am or anytime of the day, worse when she wakes up with it  Neck pain: there all the time but pain intensity fluctuates throughout the day    Describe your usual headache/pain?   Mild headaches: Aching, pressure  Moderate to severe headaches: Pounding, throbbing  Neck pain: tightness and aching    Where is your headache/pain located?   Mild headaches: frontal, right orbital  Moderate to severe headaches: frontal and orbital  Neck pain: base of her head and radiates down her shoulders - right more then left    Aura/Warning and how long does it last?   Rare-scintillating Scotomas - blurred vision or sensation of a luminous appearance (zigzag, arc-shaped form, flickering, or shimmering) in front of  eyes with obstruction of vision.  45 minutes and occurs 4-6 times a year  Central Scotomas - blind spot that sits directly inline of sight x 1 and lasted for 30 minutes  Craving - sugar    Associated symptoms with headache or neck pain:   - Decrease appetite, Nausea,  - Photophobia, Phonophobia  - Insomnia   - Nasal congestion  - Stiff or sore neck  - Light headed - 1-2 times a year  - Off balance   - Problems with concentration   -  better with lying down   - Prefer to be in a cool, quiet, dark room     Triggers: Stress, tension, missing meals, weather change, alcohol, oversleeping, dehydration    Imagin2020-MRI of the brain with and without contrast at  [facility]  EXAM MRI BRAIN W WO CONTRAST-8/31/2020 3:25 pm   HISTORY Headache, chronic, normal neuro exam   COMPARISON   CT HEAD WITHOUT CONTRAST dated 7/28/2017   TECHNIQUE   Multiplanar multi-sequence images of the brain were obtained without and with IV contrast   FINDINGS   There is no evidence of mass effect, midline shift, subacute intraparenchymal hemorrhage, or extra-axial fluid collections.   The ventricles are normal in size, shape, and configuration. Parenchymal volume is normal for age.   There is no evidence of abnormal restricted diffusion to suggest acute infarct.   Expected major vascular flow voids are seen.There is no evidence of abnormal enhancement.   No suprasellar masses are seen.   Cerebellar tonsils:  Normal. Clivus:  normal.   Visualized paranasal sinuses:  Mucosal thickening in the ethmoid and maxillary sinuses.   Visualized mastoid air cells:  Well aerated   10 x 7 mm T 2 hyperintense lesion identified within the clivus, stable since the CT exam of 07/28/2000 17 May represent a cyst.  No pathological enhancement.   IMPRESSION   No intra-axial mass, hemorrhage, nor fluid collection.   10 mm likely cyst within the clivus.        Medications:   Current Outpatient Medications:     albuterol HFA (VENTOLIN HFA) 90 mcg/actuation inhaler, INHALE 2 PUFFS BY MOUTH EVERY 4 TO 6 HOURS, Disp: , Rfl:     candesartan (ATACAND) 16 mg tablet, , Disp: , Rfl:     cyclobenzaprine (FLEXERIL) 5 mg tablet, TAKE 1-2 TABLETS BY MOUTH AT BEDTIME NIGHTLY, Disp: 60 tablet, Rfl: 1    divalproex (DEPAKOTE ER) 500 mg 24 hr ER tablet, 1 TABLET AT BEDTIME NIGHTLY, Disp: 90 tablet, Rfl: 1    magnesium oxide (MAG-OX) 400 mg (241.3 mg magnesium) tablet, Take 1 tablet (400 mg total) by mouth daily., Disp: 90 tablet, Rfl: 1    propranoloL (INDERAL) 10 mg tablet, 20 mg., Disp: , Rfl:     UBRELVY 100 mg tablet tablet, , Disp: , Rfl:     ALPRAZolam (XANAX) 0.25 mg tablet, alprazolam 0.25 mg tablet  TAKE 1 TABLET BY MOUTH DAILY AS  NEEDED, Disp: , Rfl:     dexAMETHasone (DECADRON) 2 mg tablet, 1 in a.m. daily for 3 days then as needed.  Take with food. (Patient not taking: Reported on 6/14/2024), Disp: 5 tablet, Rfl: 0    divalproex (DEPAKOTE ER) 250 mg 24 hr ER tablet, TAKE 1 TABLET BY MOUTH EVERYDAY AT BEDTIME (Patient not taking: Reported on 6/14/2024), Disp: 30 tablet, Rfl: 0    Review of Systems  All other systems reviewed and negative except as noted in the HPI.      Nupur Gandhi MD    Time Spent:    I spent 30 minutes on this date of service performing the following activities: obtaining history, entering orders, documenting, preparing for visit, obtaining / reviewing records, providing counseling and education, independently reviewing study/studies, communicating results and coordinating care.

## 2024-06-14 ENCOUNTER — OFFICE VISIT (OUTPATIENT)
Dept: NEUROLOGY | Facility: CLINIC | Age: 49
End: 2024-06-14
Payer: COMMERCIAL

## 2024-06-14 VITALS
RESPIRATION RATE: 16 BRPM | HEIGHT: 64 IN | HEART RATE: 78 BPM | OXYGEN SATURATION: 98 % | WEIGHT: 279.6 LBS | TEMPERATURE: 97.9 F | BODY MASS INDEX: 47.73 KG/M2 | DIASTOLIC BLOOD PRESSURE: 78 MMHG | SYSTOLIC BLOOD PRESSURE: 132 MMHG

## 2024-06-14 DIAGNOSIS — G43.709 CHRONIC MIGRAINE WITHOUT AURA WITHOUT STATUS MIGRAINOSUS, NOT INTRACTABLE: ICD-10-CM

## 2024-06-14 DIAGNOSIS — F41.1 GENERALIZED ANXIETY DISORDER: ICD-10-CM

## 2024-06-14 DIAGNOSIS — G43.711 INTRACTABLE CHRONIC MIGRAINE WITHOUT AURA AND WITH STATUS MIGRAINOSUS: ICD-10-CM

## 2024-06-14 DIAGNOSIS — G24.3 ISOLATED CERVICAL DYSTONIA: ICD-10-CM

## 2024-06-14 DIAGNOSIS — M54.2 CERVICALGIA: Primary | ICD-10-CM

## 2024-06-14 DIAGNOSIS — G44.40 MEDICATION OVERUSE HEADACHE: ICD-10-CM

## 2024-06-14 PROCEDURE — 3008F BODY MASS INDEX DOCD: CPT | Performed by: PSYCHIATRY & NEUROLOGY

## 2024-06-14 PROCEDURE — 3075F SYST BP GE 130 - 139MM HG: CPT | Performed by: PSYCHIATRY & NEUROLOGY

## 2024-06-14 PROCEDURE — 3078F DIAST BP <80 MM HG: CPT | Performed by: PSYCHIATRY & NEUROLOGY

## 2024-06-14 PROCEDURE — 99214 OFFICE O/P EST MOD 30 MIN: CPT | Performed by: PSYCHIATRY & NEUROLOGY

## 2024-06-14 NOTE — PATIENT INSTRUCTIONS
Chronic tension type headache:   Medication overuse headaches:   Chronic migraine headaches with and without aura:    Preventive therapy for headaches:     - Magnesium oxide or glycinate 400 mg at bedtime nightly    - For now continue Depakote 500 mg at bedtime.      - Propanolol 20 mg at bedtime for migraine headaches.  Consider weaning patient off of it by taking it once a day for a week then stop.     - Weather related headaches: Consider Benadryl 12.5 or 25 mg at bedtime as needed.    - On candesartan 8 mg twice daily-for hypertension    Abortive therapy for headaches:    At the onset of mild headache: Ubrelvy 100 mg

## 2024-08-08 DIAGNOSIS — G43.711 INTRACTABLE CHRONIC MIGRAINE WITHOUT AURA AND WITH STATUS MIGRAINOSUS: ICD-10-CM

## 2024-08-09 RX ORDER — DIVALPROEX SODIUM 500 MG/1
TABLET, FILM COATED, EXTENDED RELEASE ORAL
Qty: 90 TABLET | Refills: 1 | Status: SHIPPED | OUTPATIENT
Start: 2024-08-09 | End: 2024-12-11 | Stop reason: SDUPTHER

## 2024-08-15 DIAGNOSIS — M54.2 CERVICALGIA: ICD-10-CM

## 2024-08-15 RX ORDER — CYCLOBENZAPRINE HCL 5 MG
TABLET ORAL
Qty: 60 TABLET | Refills: 0 | Status: SHIPPED | OUTPATIENT
Start: 2024-08-15 | End: 2024-09-10 | Stop reason: SDUPTHER

## 2024-09-10 DIAGNOSIS — M54.2 CERVICALGIA: ICD-10-CM

## 2024-09-10 RX ORDER — CYCLOBENZAPRINE HCL 5 MG
TABLET ORAL
Qty: 60 TABLET | Refills: 0 | Status: SHIPPED | OUTPATIENT
Start: 2024-09-10 | End: 2024-10-08

## 2024-10-08 DIAGNOSIS — M54.2 CERVICALGIA: ICD-10-CM

## 2024-10-08 RX ORDER — CYCLOBENZAPRINE HCL 5 MG
TABLET ORAL
Qty: 60 TABLET | Refills: 0 | Status: SHIPPED | OUTPATIENT
Start: 2024-10-08 | End: 2024-11-12

## 2024-11-10 DIAGNOSIS — M54.2 CERVICALGIA: ICD-10-CM

## 2024-11-12 RX ORDER — CYCLOBENZAPRINE HCL 5 MG
TABLET ORAL
Qty: 60 TABLET | Refills: 0 | Status: SHIPPED | OUTPATIENT
Start: 2024-11-12 | End: 2024-12-18

## 2024-12-11 ENCOUNTER — OFFICE VISIT (OUTPATIENT)
Dept: NEUROLOGY | Facility: CLINIC | Age: 49
End: 2024-12-11
Payer: COMMERCIAL

## 2024-12-11 VITALS
DIASTOLIC BLOOD PRESSURE: 70 MMHG | RESPIRATION RATE: 16 BRPM | WEIGHT: 283 LBS | HEART RATE: 77 BPM | OXYGEN SATURATION: 97 % | TEMPERATURE: 98.2 F | HEIGHT: 64 IN | SYSTOLIC BLOOD PRESSURE: 124 MMHG | BODY MASS INDEX: 48.32 KG/M2

## 2024-12-11 DIAGNOSIS — G43.711 INTRACTABLE CHRONIC MIGRAINE WITHOUT AURA AND WITH STATUS MIGRAINOSUS: ICD-10-CM

## 2024-12-11 PROCEDURE — 3074F SYST BP LT 130 MM HG: CPT | Performed by: PSYCHIATRY & NEUROLOGY

## 2024-12-11 PROCEDURE — 3078F DIAST BP <80 MM HG: CPT | Performed by: PSYCHIATRY & NEUROLOGY

## 2024-12-11 PROCEDURE — 3008F BODY MASS INDEX DOCD: CPT | Performed by: PSYCHIATRY & NEUROLOGY

## 2024-12-11 PROCEDURE — 99215 OFFICE O/P EST HI 40 MIN: CPT | Performed by: PSYCHIATRY & NEUROLOGY

## 2024-12-11 RX ORDER — DIVALPROEX SODIUM 125 MG/1
125 TABLET, DELAYED RELEASE ORAL NIGHTLY
Qty: 30 TABLET | Refills: 3 | Status: SHIPPED | OUTPATIENT
Start: 2024-12-11 | End: 2025-02-05

## 2024-12-11 RX ORDER — DIVALPROEX SODIUM 250 MG/1
TABLET, FILM COATED, EXTENDED RELEASE ORAL
Qty: 30 TABLET | Refills: 3 | Status: SHIPPED | OUTPATIENT
Start: 2024-12-11 | End: 2025-02-11 | Stop reason: SDUPTHER

## 2024-12-11 NOTE — PATIENT INSTRUCTIONS
Please message us via My Chart with any questions or concerns        Neck pain:  Isolated cervical dystonia  Bruxism-currently using nightguard  -Continue neck exercises on your own on daily basis.   -Currently on cyclobenzaprine 5 mg at bedtime nightly.         Preventive therapy for headaches:      - Magnesium oxide or glycinate 400 mg at bedtime nightly     - For now continue Depakote 250 mg at bedtime until 1/3/2025 then take 125 mg nightly for 2 weeks then stop the Depakote.       - Continue Propanolol 20 mg at bedtime for migraine headaches.  Consider weaning patient off of it by taking it once a day for a week then stop.      - Weather related headaches: Consider Benadryl 12.5 or 25 mg at bedtime as needed. (May try liquid form)      - On candesartan 16 mg daily - for hypertension. To consider increasing if headaches increase.      Abortive therapy for headaches:     At the onset of mild headache: Ubrelvy 100 mg

## 2024-12-18 DIAGNOSIS — M54.2 CERVICALGIA: ICD-10-CM

## 2024-12-18 RX ORDER — CYCLOBENZAPRINE HCL 5 MG
TABLET ORAL
Qty: 60 TABLET | Refills: 0 | Status: SHIPPED | OUTPATIENT
Start: 2024-12-18 | End: 2025-01-15

## 2025-01-02 RX ORDER — DEXAMETHASONE 2 MG/1
TABLET ORAL
Qty: 5 TABLET | Refills: 0 | Status: SHIPPED | OUTPATIENT
Start: 2025-01-02 | End: 2025-01-03 | Stop reason: SDUPTHER

## 2025-01-15 DIAGNOSIS — M54.2 CERVICALGIA: ICD-10-CM

## 2025-01-15 RX ORDER — CYCLOBENZAPRINE HCL 5 MG
TABLET ORAL
Qty: 60 TABLET | Refills: 0 | Status: SHIPPED | OUTPATIENT
Start: 2025-01-15 | End: 2025-02-14

## 2025-02-05 RX ORDER — DIVALPROEX SODIUM 125 MG/1
125 TABLET, DELAYED RELEASE ORAL NIGHTLY
Qty: 90 TABLET | Refills: 1 | Status: SHIPPED | OUTPATIENT
Start: 2025-02-05 | End: 2026-02-05

## 2025-02-11 DIAGNOSIS — G43.711 INTRACTABLE CHRONIC MIGRAINE WITHOUT AURA AND WITH STATUS MIGRAINOSUS: ICD-10-CM

## 2025-02-11 RX ORDER — DIVALPROEX SODIUM 250 MG/1
TABLET, FILM COATED, EXTENDED RELEASE ORAL
Qty: 90 TABLET | Refills: 1 | Status: SHIPPED | OUTPATIENT
Start: 2025-02-11

## 2025-02-13 DIAGNOSIS — M54.2 CERVICALGIA: ICD-10-CM

## 2025-02-14 RX ORDER — CYCLOBENZAPRINE HCL 5 MG
TABLET ORAL
Qty: 60 TABLET | Refills: 0 | Status: SHIPPED | OUTPATIENT
Start: 2025-02-14 | End: 2025-03-23 | Stop reason: SDUPTHER

## 2025-03-23 DIAGNOSIS — M54.2 CERVICALGIA: ICD-10-CM

## 2025-03-24 RX ORDER — CYCLOBENZAPRINE HCL 5 MG
TABLET ORAL
Qty: 60 TABLET | Refills: 0 | Status: SHIPPED | OUTPATIENT
Start: 2025-03-24 | End: 2025-04-23

## 2025-04-23 DIAGNOSIS — M54.2 CERVICALGIA: ICD-10-CM

## 2025-04-23 RX ORDER — CYCLOBENZAPRINE HCL 5 MG
TABLET ORAL
Qty: 60 TABLET | Refills: 0 | Status: SHIPPED | OUTPATIENT
Start: 2025-04-23 | End: 2025-05-27

## 2025-05-24 DIAGNOSIS — M54.2 CERVICALGIA: ICD-10-CM

## 2025-05-27 RX ORDER — CYCLOBENZAPRINE HCL 5 MG
TABLET ORAL
Qty: 60 TABLET | Refills: 0 | Status: SHIPPED | OUTPATIENT
Start: 2025-05-27

## 2025-07-02 DIAGNOSIS — M54.2 CERVICALGIA: ICD-10-CM

## 2025-07-03 RX ORDER — CYCLOBENZAPRINE HCL 5 MG
TABLET ORAL
Qty: 60 TABLET | Refills: 0 | Status: SHIPPED | OUTPATIENT
Start: 2025-07-03

## 2025-07-21 ENCOUNTER — TELEMEDICINE (OUTPATIENT)
Dept: NEUROLOGY | Facility: CLINIC | Age: 50
End: 2025-07-21
Payer: COMMERCIAL

## 2025-07-21 DIAGNOSIS — M54.2 CERVICALGIA: ICD-10-CM

## 2025-07-21 DIAGNOSIS — G43.711 INTRACTABLE CHRONIC MIGRAINE WITHOUT AURA AND WITH STATUS MIGRAINOSUS: ICD-10-CM

## 2025-07-21 PROCEDURE — 99214 OFFICE O/P EST MOD 30 MIN: CPT | Mod: 95 | Performed by: PHYSICIAN ASSISTANT

## 2025-07-21 RX ORDER — DEXAMETHASONE 2 MG/1
TABLET ORAL
Qty: 5 TABLET | Refills: 1 | Status: SHIPPED | OUTPATIENT
Start: 2025-07-21

## 2025-07-21 RX ORDER — CYCLOBENZAPRINE HCL 5 MG
TABLET ORAL
Qty: 60 TABLET | Refills: 3 | Status: SHIPPED | OUTPATIENT
Start: 2025-07-21

## 2025-07-21 NOTE — PATIENT INSTRUCTIONS
Please message us via My Chart with any questions or concerns      Neck pain:  Isolated cervical dystonia  Bruxism-currently using nightguard  -Continue neck exercises on your own on daily basis.     Do basic neck exercises on your own on daily basis. Here are some exercises which should take 5 minutes:     1. Standing, drop your head to one side while continuing to look ahead. Hold for 10 seconds then swap sides. Repeat twice more each side. To increase the stretch, drop the opposite shoulder.    2. Standing again, lower your chin to your chest, hold for 10 and then look up to the ceiling and hold for 10. Repeat twice more.     3. Next, standing straight again, look over your right shoulder and hold firm for 10 seconds, then over your left shoulder for 10. Repeat this 3 times.     4. Finally, while sitting upright, bring your head forward and hold for 10, then all the way back and hold for 10.     -Currently on cyclobenzaprine 5 mg at bedtime nightly.     Chronic tension type headache:   Medication overuse headaches:   Chronic migraine headaches with and without aura:    Preventive therapy for headaches:     _ To consider Zonisamide (Zonegran)    - Magnesium oxide or glycinate 400 mg at bedtime nightly    - Stopped Depakote 250 mg    - Continue Propanolol 10 mg at bedtime for migraine headaches.  Consider weaning patient off of it by taking it once a day for a week then stop.     - Weather related headaches: Consider Benadryl 12.5 or 25 mg at bedtime as needed. (May try liquid form)     - On candesartan 16 mg daily - for hypertension. To consider increasing if headaches increase.     Abortive therapy for headaches:    At the onset of mild headache: Ubrelvy 100 mg or Nurtec    If that fails: Decadron 2 mg in the morning with food. May repeat daily for 3 to 5 days in a row or until headache breaks.

## 2025-07-21 NOTE — PROGRESS NOTES
Verification of Patient Location:  The patient affirms they are currently located in the following state: [place]    Are you in your home or a private residence? Yes    Request for Consent:    Audio and Video Encounter   Hello, my name is MARCO Ferrer.  Before we proceed, can you please verify your identification by telling me your full name and date of birth?  Can you tell me who is in the room with you?    You and I are about to have a telemedicine check-in or visit because you have requested it.  This is a live video-conference.  I am a real person, speaking to you in real time.  There is no one else with me on the video-conference. I am not recording this conversation and I am asking you not to record it.  This telemedicine visit will be billed to your health insurance or you, if you are self-insured.  You understand you will be responsible for any copayments or coinsurances that apply to your telemedicine visit.  Communication platform used for this encounter:  [employer] Video Visit (Epic Video Client)       Before starting our telemedicine visit, I am required to get your consent for this virtual check-in or visit by telemedicine. Do you consent?    Patient Response to Request for Consent:  Yes      Visit Documentation:  Subjective     Patient ID: Smitha Cardozo is a 49 y.o. female.  1975      HPI    The following have been reviewed and updated as appropriate in this visit:        Review of Systems    Assessment & Plan  Intractable chronic migraine without aura and with status migrainosus    Orders:    dexAMETHasone (DECADRON) 2 mg tablet; 1 in a.m. daily for 3 days . Take with food.    Cervicalgia    Orders:    cyclobenzaprine (FLEXERIL) 5 mg tablet; TAKE 1 OR 2 TABLETS BY MOUTH AT BEDTIME NIGHTLY      Time Spent:  I spent 30 minutes on this date of service performing the following activities: entering orders, documenting, preparing for visit, and providing counseling and education.       Main  [facility]   Headache Center  Nupur Gandhi MD  [address] ([address])  [place] [address]     Patient ID: Smitha Cardozo    : 1975  MRN: [id number]                                            Visit Date: 2025  Encounter Provider: Rosalina Patel   Referring Provider: No ref. provider found       Assessment/Plan   Problem List Items Addressed This Visit    None      Please message us via My Chart with any questions or concerns    Of note: Patient lives in [facility].    Anxiety:  - Xanax 0.25 mg tablet     Neck pain:  Isolated cervical dystonia  Bruxism-currently using nightguard  -Continue neck exercises on your own on daily basis.     Do basic neck exercises on your own on daily basis. Here are some exercises which should take 5 minutes:     1. Standing, drop your head to one side while continuing to look ahead. Hold for 10 seconds then swap sides. Repeat twice more each side. To increase the stretch, drop the opposite shoulder.    2. Standing again, lower your chin to your chest, hold for 10 and then look up to the ceiling and hold for 10. Repeat twice more.     3. Next, standing straight again, look over your right shoulder and hold firm for 10 seconds, then over your left shoulder for 10. Repeat this 3 times.     4. Finally, while sitting upright, bring your head forward and hold for 10, then all the way back and hold for 10.     -Currently on cyclobenzaprine 5 mg at bedtime nightly.     Chronic tension type headache:   Medication overuse headaches:   Chronic migraine headaches with and without aura:    Preventive therapy for headaches:     _ To consider Zonisamide (Zonegran)    - Magnesium oxide or glycinate 400 mg at bedtime nightly    - Stopped Depakote 250 mg    - Continue Propanolol 10 mg at bedtime for migraine headaches.  Consider weaning patient off of it by taking it once a day for a week then stop.     - Weather related headaches: Consider Benadryl  12.5 or 25 mg at bedtime as needed. (May try liquid form)     - On candesartan 16 mg daily - for hypertension. To consider increasing if headaches increase.     Abortive therapy for headaches:    At the onset of mild headache: Ubrelvy 100 mg     If that fails: Decadron 2 mg in the morning with food. May repeat daily for 3 to 5 days in a row or until headache breaks.       No follow-ups on file.         _________________________________________________________________      Chief Complaint: No chief complaint on file.      Subjective     We had the pleasure of evaluating Smitha Cardozo in neurological follow up today. As you know she  is a 49 y.o.  years old  right handed female.  she is here today for evaluation of migraine headaches.  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  Started the age of 16 and started menstruation at age 12    Previous/current treatment for headaches/pain/mood:  CBD THC: None  Intervention procedure: None  Alternative therapies:massage, acupuncture and chiropractor  Headache devices: None  Trigger point injections/nerve blocks: None  Botulinum toxin: YES last Botox January 2019 x 2   Headache infusions: Yes x 2 days August 2022  Preventive medication therapy:   - Magnesium 400 mg, vitamin B2 400 mg,  - amitriptyline 50/100 mg (provided some help for headaches as she was on this for about 10 years or longer),   - Xanax 0.25,  - Metoprolol, verapamil (not sure why stopped use this long time ago), amlodipine 5 mg, propanolol (hair loss),  - Topamax (stopped due to paresthesias/kidney stone), Lyrica, Depakote,  - Flexeril (bad dreams)  - Emgality   Abortive medication Therapy:   - Sumatriptan 100/50 mg p.o., sumatriptan injectable, Treximet, Cafergot,  - ibuprofen,  -  Excedrin,  - Prednisone (caused palpitation),  - Ubrelvy 100mg     FOLLOW-UP CLINIC NOTE:  First date seen: 6/15/2022-last seen date: 8/26/2022  Last procedure date: 8/4/2022-Dysport    8/26/2022: Since last seen she states she did well with a headache infusion and tolerated it well.  She did have some mild headaches for a few days after the headache infusion but overall all that did improve.  She has been very good with not taking any over-the-counter medication.  Last week unfortunately she developed COVID and due to this she did have headaches for which she ended up taking sumatriptan hand and had to use Advil.  She tried Nurtec with no benefit in her headaches.  Also did not think Ubrelvy completely took care of the headaches like the way sumatriptan does.  Medication used over the last 3 weeks:  - 6 triptans  -3 times Advil within the last 3 weeks.    9/29/2022 -states that she is back to taking sumatriptan daily for the last 2 weeks.  She is not sure what exacerbated the headaches.  But has been waking up every morning with a moderate to severe headache.  Sumatriptan brings the headache down significantly but does not completely take it away.  She try the Ubrelvy which does help but with only milder headaches not with more severe headaches.  She is also back to having neck pain on the right side with stiffness.      10/19/2023: Since last seen patient states overall she is doing significantly well in regards to her tension type headache migraine headaches and neck pain.  She is also stopped taking the Emgality.  She wanted to touch base today in regards to her right eye swelling and pain.  States she is seeing an eye specialist locally who recently did biopsy after getting a CT of the orbit done.  As they were not sure if this was lymphoma or just lacrimal duct inflammation.  The biopsy came back to be negative.  They are also doing other blood work to identify the cause of patient's right lid swelling and  pain.  States she does have a bit of a droopiness which is noted on the visit today.  They are more leaning towards possible autoimmune disease.  Patient also reports that she had few events of double vision when she woke up in the morning but it did not last long.  Suggested once the work-up is completed she could consider seeing [facility] for second opinion as well.  She had MRI of the brain done locally.  Which she will bring with her next time so we can review that with her on PACS.    2/16/2024: She has not had Dysport since summer 2023 and is off of Emgality since fall 2023.  States she continues to do well in regards to migraine headaches and tension type headaches.  Does tend to have more neck pain but it is not intense and not debilitating.  Wants to now try to come off of some of her preventative medication.      6/14/2024: Patient messaged April 26 as she noticed increase in the uptake of her migraine headaches.  At that time she started back on Depakote 250 mg and Decadron 2 mg was called in for 5 days.  She went down on the Depakote 250 mg and did go back up to 500mg in 2 week.  Has been doing well in regards to her headaches since then.    12/11/2024: Overall she has been doing the same and well. Decreased the Depakote again to 250 mg about 3 weeks ago and this time has been doing well - no increase in the headaches. Would like to taper off Depakote and Propranolol in the future.     7/21/2025:Tapered off of Depakote successfully. Ubrelvy is working well abortively. Is unsure how many she uses per month - estimates around 10 per month. May need to add an additional preventive medication. Patient will keep a log for next visit.     What is your current pain level?   Headache: 2/10  Neck pain: no pain stiffness of 4/10    How often do the headaches/pain occur?   Tension type headache: Sometimes but mild is there all the time.  10/19/2023:1-2 times a week or less  2/16/2024: one a week every other  week  6/14/2024:2 times  12/11/2024: 3 times a week    Migraine headache: Only one intense migraine and took 6 Imitrex over the last 3 weeks  10/19/2023:none  2/16/2024: none since last seen  6/14/2024: none  12/11/2024: Took Ubrelvy which decresed the pain but didn't resolve. Has had 2-3 migraines since September.     Neck pain: daily  10/19/2023: stiffness no pain  2/16/2024: daily  6/14/2024:stiffness is daily  12/11/2024: stiffness is daily     How often do you take abortive medication for headache in a week?  Over-the-counter medication: Daily over-the-counter medication for years - Excedrin migraines one in am and one in the afternoon. Then at night time she takes Advil  -since last seen  - Used Advil at least 4 times a week.  - Use Excedrin at least 2-3 times a week.   10/19/2023  Excedrin: none  Advil: Back pain occasionally but not for headaches.    2/16/2024: None  6/14/2024:Excedrin rare, Advil for other pain.        Migraine specific medication: 50 mg to 100mg once a day daily or at time twice a day. Paying out of pocket for this.   Since last seen:  Sumatriptan use within the last 6 weeks-25 tabs  Ubrelvy use within the last 6 weeks-20 tabs  10/19/2023:  Sumatriptan: none  Ubrelvy: none   2/16/2024: 1-2 a week or less  6/14/2024: Ubrelvy 2 a week she may go with a stretch with non  12/11/2024: Takes Ubrelvy twice a week     What is the intensity of pain?   Mild headaches: 4/10  12/11/2024 2-3/10    Moderate to severe headaches: 5-6 - 10/10  12/11/2024: 6    Neck pain: 4 to 6/10  12/11/2024: 4/10    How long do the headaches/pain last?   Mild headaches: few hours with med's  Moderate to severe headaches: with an hour it goes down to a lower number  Neck pain: there all the time but goes to a lower number with med's    What time of the day do headaches/pain start?  Mild headaches:  afternoon  Moderate to severe headaches: am or anytime of the day, worse when she wakes up with it  Neck pain: there all the  time but pain intensity fluctuates throughout the day    Describe your usual headache/pain?   Mild headaches: Aching, pressure  Moderate to severe headaches: Pounding, throbbing  Neck pain: tightness and aching    Where is your headache/pain located?   Mild headaches: frontal, right orbital  Moderate to severe headaches: frontal and orbital  Neck pain: base of her head and radiates down her shoulders - right more then left    Aura/Warning and how long does it last?   Rare-scintillating Scotomas - blurred vision or sensation of a luminous appearance (zigzag, arc-shaped form, flickering, or shimmering) in front of  eyes with obstruction of vision.  45 minutes and occurs 4-6 times a year  Central Scotomas - blind spot that sits directly inline of sight x 1 and lasted for 30 minutes  Craving - sugar    2024: None    Associated symptoms with headache or neck pain:   - Decrease appetite, Nausea,  - Photophobia, Phonophobia  - Insomnia   - Nasal congestion  - Stiff or sore neck  - Light headed - 1-2 times a year  - Off balance   - Problems with concentration   -  better with lying down   - Prefer to be in a cool, quiet, dark room     Triggers: Stress, tension, missing meals, weather change, alcohol, oversleeping, dehydration    Imagin2020-[facility]-2020 3:25 pm   HISTORY Headache, chronic, normal neuro exam   COMPARISON   CT HEAD WITHOUT CONTRAST dated 2017   TECHNIQUE   Multiplanar multi-sequence images of the brain were obtained without and with IV contrast   FINDINGS   There is no evidence of mass effect, midline shift, subacute intraparenchymal hemorrhage, or extra-axial fluid collections.   The ventricles are normal in size, shape, and configuration. Parenchymal volume is normal for age.   There is no evidence of abnormal restricted diffusion to suggest acute infarct.   Expected major vascular flow voids are  seen.There is no evidence of abnormal enhancement.   No suprasellar masses are seen.   Cerebellar tonsils:  Normal. Clivus:  normal.   Visualized paranasal sinuses:  Mucosal thickening in the ethmoid and maxillary sinuses.   Visualized mastoid air cells:  Well aerated   10 x 7 mm T 2 hyperintense lesion identified within the clivus, stable since the CT exam of 07/28/2000 17 May represent a cyst.  No pathological enhancement.   IMPRESSION   No intra-axial mass, hemorrhage, nor fluid collection.   10 mm likely cyst within the clivus.        Medications:   Current Outpatient Medications:     albuterol HFA (VENTOLIN HFA) 90 mcg/actuation inhaler, INHALE 2 PUFFS BY MOUTH EVERY 4 TO 6 HOURS, Disp: , Rfl:     ALPRAZolam (XANAX) 0.25 mg tablet, alprazolam 0.25 mg tablet  TAKE 1 TABLET BY MOUTH DAILY AS NEEDED (Patient not taking: Reported on 12/11/2024), Disp: , Rfl:     candesartan (ATACAND) 16 mg tablet, , Disp: , Rfl:     cyclobenzaprine (FLEXERIL) 5 mg tablet, TAKE 1 OR 2 TABLETS BY MOUTH AT BEDTIME NIGHTLY, Disp: 60 tablet, Rfl: 0    dexAMETHasone (DECADRON) 2 mg tablet, 1 in a.m. daily for 3 days . Take with food., Disp: 5 tablet, Rfl: 0    divalproex (DEPAKOTE ER) 250 mg 24 hr ER tablet, 1 tablet at bedtime nightly, Disp: 90 tablet, Rfl: 1    divalproex (DEPAKOTE) 125 mg EC tablet, Take 1 tablet (125 mg total) by mouth nightly., Disp: 90 tablet, Rfl: 1    magnesium oxide (MAG-OX) 400 mg (241.3 mg magnesium) tablet, Take 1 tablet (400 mg total) by mouth daily., Disp: 90 tablet, Rfl: 1    propranoloL (INDERAL) 10 mg tablet, 20 mg., Disp: , Rfl:     UBRELVY 100 mg tablet tablet, , Disp: , Rfl:     Review of Systems  All other systems reviewed and negative except as noted in the HPI.    Rosalina Patel PA-C    Time Spent:

## 2025-07-21 NOTE — ASSESSMENT & PLAN NOTE
Orders:    cyclobenzaprine (FLEXERIL) 5 mg tablet; TAKE 1 OR 2 TABLETS BY MOUTH AT BEDTIME NIGHTLY